# Patient Record
Sex: FEMALE | Race: BLACK OR AFRICAN AMERICAN
[De-identification: names, ages, dates, MRNs, and addresses within clinical notes are randomized per-mention and may not be internally consistent; named-entity substitution may affect disease eponyms.]

---

## 2017-04-25 NOTE — EDM.PDOC
ED HPI GENERAL MEDICAL PROBLEM





- General


Chief Complaint: Back Pain or Injury


Stated Complaint: BACK PAIN


Time Seen by Provider: 04/25/17 10:55





- History of Present Illness


INITIAL COMMENTS - FREE TEXT/NARRATIVE: 





HISTORY AND PHYSICAL:





History of present illness: Patient is a 21-year-old black female history of 

chronic intermittent upper back and neck pain related to scoliosis she uses 

Flexeril when necessary and states that the episode today has been a slightly 

more significant than usual and unresponsive to her Flexeril she denies trauma 

denies numbness weakness or other concerns





Review of systems: 


As per history of present illness and below otherwise all systems reviewed and 

negative.





Past medical history: 


As per history of present illness and as reviewed below otherwise 

noncontributory.





Surgical history: 


As per history of present illness and as reviewed below otherwise 

noncontributory.





Social history: 


No reported history of drug or alcohol abuse.





Family history: 


As per history of present illness and as reviewed below otherwise 

noncontributory.





Physical exam:


HEENT: Atraumatic, normocephalic, pupils reactive, negative for conjunctival 

pallor or scleral icterus, mucous membranes moist, throat clear, neck supple, 

nontender, trachea midline.


Lungs: Clear to auscultation, breath sounds equal bilaterally, chest nontender.


Heart: S1S2, regular, negative for clicks, rubs, or JVD.


Abdomen: Soft, nondistended, nontender. Negative for masses or 

hepatosplenomegaly. Negative for costovertebral tenderness.


Pelvis: Stable nontender.


Genitourinary: Deferred.


Rectal: Deferred.


Extremities: Atraumatic, negative for cords or calf pain. Neurovascular 

unremarkable.


Neuro: Awake, alert, oriented. Cranial nerves II through XII unremarkable. 

Cerebellum unremarkable. Motor and sensory unremarkable throughout. Exam 

nonfocal.


Back: Patient is a mild tenderness in the paravertebral region at the level of 

the cervical and thoracic spine no vertebral body or point tenderness no 

cervical radiculopathy


Diagnostics:


None





Therapeutics:


Toradol 60 mg IM hydrocodone 5 mg by mouth





Impression: 


#1 chronic intermittent cervical thoracic pain #2 history of scoliosis





Definitive disposition and diagnosis as appropriate pending reevaluation and 

review of above.


  ** Back


Pain Score (Numeric/FACES): 10





- Related Data


 Allergies











Allergy/AdvReac Type Severity Reaction Status Date / Time


 


aspirin Allergy  Respiratory Verified 04/25/17 11:08





   Distress  


 


naproxen [From Aleve] Allergy  Respiratory Verified 04/25/17 11:08





   Distress  











Home Meds: 


 Home Meds





Albuterol Sulfate [Proventil Hfa] 2 puff INH Q6HR PRN 04/25/17 [History]


Cyclobenzaprine [Flexeril] 5 mg PO DAILY PRN 04/25/17 [History]


Levalbuterol HCl [Xopenex] 1 dose INH Q6HR PRN 04/25/17 [History]











Past Medical History


Cardiovascular History: Reports: High cholesterol


Respiratory History: Reports: Asthma


Musculoskeletal History: Reports: Other (see below)


Other Musculoskeletal History: scoliosis





Social & Family History





- Family History


Family Medical History: Noncontributory





- Tobacco Use


Smoking Status *Q: Never Smoker





- Caffeine Use


Caffeine Use: Reports: None





- Recreational Drug Use


Recreational Drug Use: No





ED ROS GENERAL





- Review of Systems


Review Of Systems: ROS reveals no pertinent complaints other than HPI.





ED EXAM, GENERAL





- Physical Exam


Exam: See Below (See dictation)





Course





- Vital Signs


Last Recorded V/S: 





 Last Vital Signs











Temp  36.6 C   04/25/17 11:05


 


Pulse  104 H  04/25/17 11:05


 


Resp  18   04/25/17 11:05


 


BP  133/83   04/25/17 11:05


 


Pulse Ox  98   04/25/17 11:05














- Orders/Labs/Meds


Meds: 





Medications














Discontinued Medications














Generic Name Dose Route Start Last Admin





  Trade Name Freq  PRN Reason Stop Dose Admin


 


Hydrocodone Bitart/Acetaminophen  1 tab  04/25/17 11:07  04/25/17 11:16





  Norco 325-5 Mg  PO  04/25/17 11:08  1 tab





  ONETIME ONE   Administration


 


Ketorolac Tromethamine  60 mg  04/25/17 11:06  04/25/17 11:17





  Toradol  IM  04/25/17 11:07  60 mg





  ONETIME ONE   Administration














Departure





- Departure


Time of Disposition: 11:23


Disposition: Home, Self-Care 01


Condition: good


Clinical Impression: 


 Chronic back pain, History of scoliosis





Forms:  ED Department Discharge


Additional Instructions: 


The following information is given to patients seen in the emergency department 

who are being discharged to home. This information is to outline your options 

for follow-up care. We provide all patients seen in our emergency department 

with a follow-up referral.





The need for follow-up, as well as the timing and circumstances, are variable 

depending upon the specifics of your emergency department visit.





If you don't have a primary care physician on staff, we will provide you with a 

referral. We always advise you to contact your personal physician following an 

emergency department visit to inform them of the circumstance of the visit and 

for follow-up with them and/or the need for any referrals to a consulting 

specialist.





The emergency department will also refer you to a specialist when appropriate. 

This referral assures that you have the opportunity for followup care with a 

specialist. All of these measure are taken in an effort to provide you with 

optimal care, which includes your followup.





Under all circumstances we always encourage you to contact your private 

physician who remains a resource for coordinating  your care. When calling for 

followup care, please make the office aware that this follow-up is from your 

recent emergency room visit. If for any reason you are refused follow-up, 

please contact the [facility] emergency department at [phone] 

and asked to speak to the emergency department charge nurse.























Continue current medications Ultram as prescribed follow up primary medical 

doctor one to 2 days return as needed as discussed

## 2017-05-21 NOTE — EDM.PDOC
ED HPI GENERAL MEDICAL PROBLEM





- General


Chief Complaint: Back Pain or Injury


Stated Complaint: BACK SPASMS


Time Seen by Provider: 05/21/17 12:50


Source of Information: Reports: Patient


History Limitations: Reports: No Limitations





- History of Present Illness


INITIAL COMMENTS - FREE TEXT/NARRATIVE: 





History of present illness:


[21-year-old female coming in complaining of back pain. Patient has a known 

history of back pain was seen in ED for this prior and indicated she felt 

followup with physical therapy as directed. If she is out of her muscle 

relaxers and is having spasm in her lower back again.]





Review of systems: 


As per history of present illness and below otherwise all systems reviewed and 

negative.





Past medical history: 


As per history of present illness and as reviewed below otherwise 

noncontributory.





Surgical history: 


As per history of present illness and as reviewed below otherwise 

noncontributory.





Social history: 


No reported history of drug or alcohol abuse.





Family history: 


As per history of present illness and as reviewed below otherwise 

noncontributory.





Physical exam:


HEENT: Atraumatic, normocephalic, pupils reactive, negative for conjunctival 

pallor or scleral icterus, mucous membranes moist, throat clear, neck supple, 

nontender, trachea midline.


Lungs: Clear to auscultation, breath sounds equal bilaterally, chest nontender.


Heart: S1S2, regular, negative for clicks, rubs, or JVD.


Abdomen: Soft, nondistended, nontender. Negative for masses or 

hepatosplenomegaly. Negative for costovertebral tenderness.


Pelvis: Stable nontender.


Genitourinary: Deferred.


Rectal: Deferred.


Extremities: Atraumatic, negative for cords or calf pain. Neurovascular 

unremarkable.


Neuro: Awake, alert, oriented. Cranial nerves II through XII unremarkable. 

Cerebellum unremarkable. Motor and sensory unremarkable throughout. Exam 

nonfocal.








Global assessment was benign save subjective complaint as noted in the history 

of present illness





Diagnostics:


[]





Therapeutics:


[]





Impression: 


[Back pain]





Plan:


[Norflex, followup with PCP]





Definitive disposition and diagnosis as appropriate pending reevaluation and 

review of above.








  ** upper back


Pain Score (Numeric/FACES): 8





- Related Data


 Allergies











Allergy/AdvReac Type Severity Reaction Status Date / Time


 


aspirin Allergy  Respiratory Verified 05/21/17 12:43





   Distress  


 


naproxen [From Aleve] Allergy  Respiratory Verified 05/21/17 12:43





   Distress  











Home Meds: 


 Home Meds





Orphenadrine [Norflex] 100 mg PO BID #28 tab.er 05/21/17 [Rx]











Past Medical History


Cardiovascular History: Reports: High Cholesterol


Respiratory History: Reports: Asthma


Musculoskeletal History: Reports: Other (See Below)


Other Musculoskeletal History: scoliosis


Hematologic History: Reports: Other (See Below)


Other Hematologic History: Sickle cell trait, Factor 7, Factor 11 and factor 12 

deficiency





Social & Family History





- Family History


Family Medical History: Noncontributory





- Tobacco Use


Smoking Status *Q: Never Smoker


Second Hand Smoke Exposure: No





- Caffeine Use


Caffeine Use: Reports: Tea





- Recreational Drug Use


Recreational Drug Use: No





ED ROS GENERAL





- Review of Systems


Review Of Systems: See Below (See history of present illness)





ED EXAM,LOWER BACK PAIN/INJURY





- Physical Exam


Exam: See Below (See history of present illness)





Course





- Vital Signs


Last Recorded V/S: 





 Last Vital Signs











Temp  36.9 C   05/21/17 12:45


 


Pulse  90   05/21/17 12:45


 


Resp  18   05/21/17 12:45


 


BP  115/73   05/21/17 12:45


 


Pulse Ox      














- Orders/Labs/Meds


Meds: 





Medications














Discontinued Medications














Generic Name Dose Route Start Last Admin





  Trade Name Freq  PRN Reason Stop Dose Admin


 


Ketorolac Tromethamine  60 mg  05/21/17 13:07  





  Toradol  IM  05/21/17 13:08  





  ONETIME ONE   














Departure





- Departure


Time of Disposition: 13:25


Disposition: Home, Self-Care 01


Condition: good


Clinical Impression: 


 Chronic back pain








- Discharge Information


Prescriptions: 


Orphenadrine [Norflex] 100 mg PO BID #28 tab.er


Forms:  ED Department Discharge


Additional Instructions: 


The following information is given to patients seen in the emergency department 

who are being discharged to home. This information is to outline your options 

for follow-up care. We provide all patients seen in our emergency department 

with a follow-up referral.





The need for follow-up, as well as the timing and circumstances, are variable 

depending upon the specifics of your emergency department visit.





If you don't have a primary care physician on staff, we will provide you with a 

referral. We always advise you to contact your personal physician following an 

emergency department visit to inform them of the circumstance of the visit and 

for follow-up with them and/or the need for any referrals to a consulting 

specialist.





The emergency department will also refer you to a specialist when appropriate. 

This referral assures that you have the opportunity for follow-up care with a 

specialist. All of these measure are taken in an effort to provide you with 

optimal care, which includes your follow-up.





Under all circumstances we always encourage you to contact your private 

physician who remains a resource for coordinating your care. When calling for 

follow-up care, please make the office aware that this follow-up is from your 

recent emergency room visit. If for any reason you are refused follow-up, 

please contact the [facility] Emergency 

Department at [phone] and asked to speak to the emergency department 

charge nurse.





Take medication as directed








Please followup with your PCP for physical therapy as discussed








Return to ED as needed as discussed

## 2017-08-29 ENCOUNTER — HOSPITAL ENCOUNTER (EMERGENCY)
Dept: HOSPITAL 56 - MW.ED | Age: 22
Discharge: HOME | End: 2017-08-29
Payer: COMMERCIAL

## 2017-08-29 VITALS — DIASTOLIC BLOOD PRESSURE: 65 MMHG | SYSTOLIC BLOOD PRESSURE: 111 MMHG

## 2017-08-29 DIAGNOSIS — N39.0: ICD-10-CM

## 2017-08-29 DIAGNOSIS — Z88.8: ICD-10-CM

## 2017-08-29 DIAGNOSIS — A09: Primary | ICD-10-CM

## 2017-08-29 DIAGNOSIS — Z88.6: ICD-10-CM

## 2017-08-29 DIAGNOSIS — J45.909: ICD-10-CM

## 2017-08-29 DIAGNOSIS — E78.00: ICD-10-CM

## 2017-08-29 LAB
CHLORIDE SERPL-SCNC: 105 MMOL/L (ref 98–110)
SODIUM SERPL-SCNC: 141 MMOL/L (ref 136–146)

## 2017-08-29 PROCEDURE — 96374 THER/PROPH/DIAG INJ IV PUSH: CPT

## 2017-08-29 PROCEDURE — 81025 URINE PREGNANCY TEST: CPT

## 2017-08-29 PROCEDURE — 80053 COMPREHEN METABOLIC PANEL: CPT

## 2017-08-29 PROCEDURE — 83690 ASSAY OF LIPASE: CPT

## 2017-08-29 PROCEDURE — 99283 EMERGENCY DEPT VISIT LOW MDM: CPT

## 2017-08-29 PROCEDURE — 81001 URINALYSIS AUTO W/SCOPE: CPT

## 2017-08-29 PROCEDURE — 85025 COMPLETE CBC W/AUTO DIFF WBC: CPT

## 2017-08-29 PROCEDURE — 96361 HYDRATE IV INFUSION ADD-ON: CPT

## 2017-08-29 PROCEDURE — 36415 COLL VENOUS BLD VENIPUNCTURE: CPT

## 2017-08-29 NOTE — EDM.PDOC
ED HPI GENERAL MEDICAL PROBLEM





- General


Chief Complaint: Gastrointestinal Problem


Stated Complaint: DEHYDRATED/NAUSEA/CHILLS/WEAKNESS


Time Seen by Provider: 08/29/17 18:23


Source of Information: Reports: Patient


History Limitations: Reports: No Limitations





- History of Present Illness


INITIAL COMMENTS - FREE TEXT/NARRATIVE: 





HISTORY AND PHYSICAL:





History of present illness:


Patient is a 21-year-old female that presents to the emergency room today with 

complaints of nausea, vomiting, diarrhea, decreased appetite, weakness, and 

"dehydration" since Saturday. Patient reports that she has not felt well and 

has tried to increase her fluids and her own but is not improving. Denies any 

fever, abdominal pain, bloody stools, or dysuria. Reports she has not been 

around anyone who has been ill recently.


Reports she does not have any significant health problems.


Reports she started menses on Saturday.





Review of systems: 


As per history of present illness and below otherwise all systems reviewed and 

negative.





Past medical history: 


As per history of present illness and as reviewed below otherwise 

noncontributory.





Surgical history: 


As per history of present illness and as reviewed below otherwise 

noncontributory.





Social history: 


No reported history of drug or alcohol abuse.





Family history: 


As per history of present illness and as reviewed below otherwise 

noncontributory.





Physical exam:


Gen.: Nontoxic appearing 21-year-old female. Able to answer questions 

appropriately. Able to speak in full sentences without shortness of breath. 

Alert and oriented 3


HEENT: Atraumatic, normocephalic, pupils reactive, negative for conjunctival 

pallor or scleral icterus, mucous membranes moist, throat clear, neck supple, 

nontender, trachea midline.


Lungs: Clear to auscultation, breath sounds equal bilaterally, chest nontender.


Heart: S1S2, regular, negative for clicks, rubs, or JVD.


Abdomen: Soft, nondistended, nontender. Negative for masses or 

hepatosplenomegaly. Negative for costovertebral tenderness.


Pelvis: Stable nontender.


Genitourinary: Deferred.


Rectal: Deferred.


Extremities: Atraumatic, negative for cords or calf pain. Neurovascular 

unremarkable.


Neuro: Awake, alert, oriented. Cranial nerves II through XII unremarkable. 

Cerebellum unremarkable. Motor and sensory unremarkable throughout. Exam 

nonfocal.





Diagnostics:


CBC, CMP, lipase, UA, hCGU





Therapeutics:


IV fluids and Zofran





Impression: 


1. Viral gastroenteritis


2. UTI





1. Please continue to encourage fluids. Intake Tylenol and/or ibuprofen over-the

-counter for pain and discomfort.


2. Take antibiotic as prescribed


3. Follow-up with her primary care provider in the next 1-2 days. Return to the 

ED as needed as discussed.





Definitive disposition and diagnosis as appropriate pending reevaluation and 

review of above.


Onset: Other (Saturday)


Onset Date: 08/26/17


Location: Reports: Generalized


  ** no pain


Pain Score (Numeric/FACES): 0





- Related Data


 Allergies











Allergy/AdvReac Type Severity Reaction Status Date / Time


 


aspirin Allergy  Respiratory Verified 05/21/17 12:43





   Distress  


 


naproxen [From Aleve] Allergy  Respiratory Verified 05/21/17 12:43





   Distress  











Home Meds: 


 Home Meds





Orphenadrine [Norflex] 100 mg PO BID #28 tab.er 05/21/17 [Rx]











Past Medical History


Cardiovascular History: Reports: High Cholesterol


Respiratory History: Reports: Asthma


Musculoskeletal History: Reports: Other (See Below)


Other Musculoskeletal History: scoliosis


Hematologic History: Reports: Other (See Below)


Other Hematologic History: Sickle cell trait, Factor 7, Factor 11 and factor 12 

deficiency





Social & Family History





- Family History


Family Medical History: Noncontributory





- Tobacco Use


Smoking Status *Q: Never Smoker


Second Hand Smoke Exposure: No





- Caffeine Use


Caffeine Use: Reports: Tea





- Recreational Drug Use


Recreational Drug Use: No





ED ROS GENERAL





- Review of Systems


Review Of Systems: ROS reveals no pertinent complaints other than HPI.





ED EXAM, GI/ABD





- Physical Exam


Exam: See Below (See dictation)





Course





- Vital Signs


Last Recorded V/S: 


 Last Vital Signs











Temp  36.9 C   08/29/17 19:52


 


Pulse  69   08/29/17 19:52


 


Resp  18   08/29/17 19:52


 


BP  111/65   08/29/17 19:52


 


Pulse Ox  98   08/29/17 19:52














- Orders/Labs/Meds


Labs: 


 Laboratory Tests











  08/29/17 08/29/17 08/29/17 Range/Units





  18:24 18:24 18:24 


 


WBC  10.60    (4.0-11.0)  K/uL


 


RBC  4.68    (4.30-5.90)  M/uL


 


Hgb  11.8 L    (12.0-16.0)  g/dL


 


Hct  35.4 L    (36.0-46.0)  %


 


MCV  75.6 L    (80.0-98.0)  fL


 


MCH  25.2 L    (27.0-32.0)  pg


 


MCHC  33.3    (31.0-37.0)  g/dL


 


RDW Std Deviation  37.0    (28.0-62.0)  fl


 


RDW Coeff of Virgil  14    (11.0-15.0)  %


 


Plt Count  344    (150-400)  K/uL


 


MPV  9.50    (7.40-12.00)  fL


 


Neut % (Auto)  59.5    (48.0-80.0)  %


 


Lymph % (Auto)  30.8    (16.0-40.0)  %


 


Mono % (Auto)  7.8    (0.0-15.0)  %


 


Eos % (Auto)  1.4    (0.0-7.0)  %


 


Baso % (Auto)  0.5    (0.0-1.5)  %


 


Neut # (Auto)  6.3 H    (1.4-5.7)  K/uL


 


Lymph # (Auto)  3.3 H    (0.6-2.4)  K/uL


 


Mono # (Auto)  0.8    (0.0-0.8)  K/uL


 


Eos # (Auto)  0.2    (0.0-0.7)  K/uL


 


Baso # (Auto)  0.1    (0.0-0.1)  K/uL


 


Nucleated RBC %  0.0    /100WBC


 


Nucleated RBCs #  0    K/uL


 


Sodium   141   (136-146)  mmol/L


 


Potassium   3.5   (3.5-5.1)  mmol/L


 


Chloride   105   ()  mmol/L


 


Carbon Dioxide   24   (21-31)  mmol/L


 


BUN   10   (6.0-23.0)  mg/dL


 


Creatinine   0.8   (0.6-1.5)  mg/dL


 


Est Cr Clr Drug Dosing   108.17   mL/min


 


Estimated GFR (MDRD)   > 60.0   ml/min


 


Glucose   91   ()  mg/dL


 


Calcium   9.7   (8.8-10.8)  mg/dL


 


Total Bilirubin   0.6   (0.1-1.5)  mg/dL


 


AST   15   (5-40)  IU/L


 


ALT   11   (8-54)  IU/L


 


Alkaline Phosphatase   52   ()  


 


Total Protein   8.3 H   (6.0-8.0)  g/dL


 


Albumin   4.6   (3.5-5.0)  g/dL


 


Globulin   3.7 H   (2.0-3.5)  g/dL


 


Albumin/Globulin Ratio   1.2 L   (1.3-2.8)  


 


Lipase    39  (7-80)  U/L


 


Urine Color     


 


Urine Appearance     


 


Urine pH     (5.0-8.0)  


 


Ur Specific Gravity     (1.001-1.035)  


 


Urine Protein     (NEGATIVE)  mg/dL


 


Urine Glucose (UA)     (NEGATIVE)  mg/dL


 


Urine Ketones     (NEGATIVE)  mg/dL


 


Urine Occult Blood     (NEGATIVE)  


 


Urine Nitrite     (NEGATIVE)  


 


Urine Bilirubin     (NEGATIVE)  


 


Urine Urobilinogen     (<2.0)  EU/dL


 


Ur Leukocyte Esterase     (NEGATIVE)  


 


Urine RBC     (0-2/HPF)  


 


Urine WBC     (0-5/HPF)  


 


Ur Epithelial Cells     (NONE-FEW)  


 


Urine Bacteria     (NEGATIVE)  


 


Urine HCG, Qual     (NEGATIVE)  














  08/29/17 08/29/17 Range/Units





  19:15 19:15 


 


WBC    (4.0-11.0)  K/uL


 


RBC    (4.30-5.90)  M/uL


 


Hgb    (12.0-16.0)  g/dL


 


Hct    (36.0-46.0)  %


 


MCV    (80.0-98.0)  fL


 


MCH    (27.0-32.0)  pg


 


MCHC    (31.0-37.0)  g/dL


 


RDW Std Deviation    (28.0-62.0)  fl


 


RDW Coeff of Virgil    (11.0-15.0)  %


 


Plt Count    (150-400)  K/uL


 


MPV    (7.40-12.00)  fL


 


Neut % (Auto)    (48.0-80.0)  %


 


Lymph % (Auto)    (16.0-40.0)  %


 


Mono % (Auto)    (0.0-15.0)  %


 


Eos % (Auto)    (0.0-7.0)  %


 


Baso % (Auto)    (0.0-1.5)  %


 


Neut # (Auto)    (1.4-5.7)  K/uL


 


Lymph # (Auto)    (0.6-2.4)  K/uL


 


Mono # (Auto)    (0.0-0.8)  K/uL


 


Eos # (Auto)    (0.0-0.7)  K/uL


 


Baso # (Auto)    (0.0-0.1)  K/uL


 


Nucleated RBC %    /100WBC


 


Nucleated RBCs #    K/uL


 


Sodium    (136-146)  mmol/L


 


Potassium    (3.5-5.1)  mmol/L


 


Chloride    ()  mmol/L


 


Carbon Dioxide    (21-31)  mmol/L


 


BUN    (6.0-23.0)  mg/dL


 


Creatinine    (0.6-1.5)  mg/dL


 


Est Cr Clr Drug Dosing    mL/min


 


Estimated GFR (MDRD)    ml/min


 


Glucose    ()  mg/dL


 


Calcium    (8.8-10.8)  mg/dL


 


Total Bilirubin    (0.1-1.5)  mg/dL


 


AST    (5-40)  IU/L


 


ALT    (8-54)  IU/L


 


Alkaline Phosphatase    ()  


 


Total Protein    (6.0-8.0)  g/dL


 


Albumin    (3.5-5.0)  g/dL


 


Globulin    (2.0-3.5)  g/dL


 


Albumin/Globulin Ratio    (1.3-2.8)  


 


Lipase    (7-80)  U/L


 


Urine Color   YELLOW  


 


Urine Appearance   CLEAR  


 


Urine pH   6.0  (5.0-8.0)  


 


Ur Specific Gravity   1.025  (1.001-1.035)  


 


Urine Protein   NEGATIVE  (NEGATIVE)  mg/dL


 


Urine Glucose (UA)   NEGATIVE  (NEGATIVE)  mg/dL


 


Urine Ketones   TRACE H  (NEGATIVE)  mg/dL


 


Urine Occult Blood   LARGE H  (NEGATIVE)  


 


Urine Nitrite   NEGATIVE  (NEGATIVE)  


 


Urine Bilirubin   NEGATIVE  (NEGATIVE)  


 


Urine Urobilinogen   0.2  (<2.0)  EU/dL


 


Ur Leukocyte Esterase   TRACE  (NEGATIVE)  


 


Urine RBC   3-6  (0-2/HPF)  


 


Urine WBC   8-10  (0-5/HPF)  


 


Ur Epithelial Cells   FEW  (NONE-FEW)  


 


Urine Bacteria   FEW  (NEGATIVE)  


 


Urine HCG, Qual  NEGATIVE   (NEGATIVE)  











Meds: 


Medications














Discontinued Medications














Generic Name Dose Route Start Last Admin





  Trade Name Freq  PRN Reason Stop Dose Admin


 


Sodium Chloride  1,000 mls @ 999 mls/hr  08/29/17 18:16  08/29/17 18:27





  Normal Saline  IV  08/29/17 19:16  999 mls/hr





  STAT ONE   Administration


 


Ondansetron HCl  4 mg  08/29/17 18:16  08/29/17 18:29





  Zofran  IVPUSH  08/29/17 18:17  4 mg





  ONETIME ONE   Administration














Departure





- Departure


Time of Disposition: 19:54


Disposition: Home, Self-Care 01


Condition: Good


Clinical Impression: 


UTI (urinary tract infection)


Qualifiers:


 Urinary tract infection type: site unspecified 








- Discharge Information


Referrals: 


Jacinto Bennett MD [Primary Care Provider] - 


Forms:  ED Department Discharge


Additional Instructions: 





The following information is given to patients seen in the emergency department 

who are being discharged to home. This information is to outline your options 

for follow-up care. We provide all patients seen in our emergency department 

with a follow-up referral.





The need for follow-up, as well as the timing and circumstances, are variable 

depending upon the specifics of your emergency department visit.





If you don't have a primary care physician on staff, we will provide you with a 

referral. We always advise you to contact your personal physician following an 

emergency department visit to inform them of the circumstance of the visit and 

for follow-up with them and/or the need for any referrals to a consulting 

specialist.





The emergency department will also refer you to a specialist when appropriate. 

This referral assures that you have the opportunity for followup care with a 

specialist. All of these measure are taken in an effort to provide you with 

optimal care, which includes your followup.





Under all circumstances we always encourage you to contact your private 

physician who remains a resource for coordinating  your care. When calling for 

followup care, please make the office aware that this follow-up is from your 

recent emergency room visit. If for any reason you are refused follow-up, 

please contact the [facility] emergency department at [phone] 

and asked to speak to the emergency department charge nurse.





[facility]


Primary Care


[address] [place] [id number]


Phone: [phone]


Fax: [phone]





1. Please continue to encourage fluids. Intake Tylenol and/or ibuprofen over-the

-counter for pain and discomfort.


2. Take antibiotic as prescribed


3. Follow-up with her primary care provider in the next 1-2 days. Return to the 

ED as needed as discussed.

## 2017-12-21 ENCOUNTER — HOSPITAL ENCOUNTER (EMERGENCY)
Dept: HOSPITAL 56 - MW.ED | Age: 22
Discharge: HOME | End: 2017-12-21
Payer: COMMERCIAL

## 2017-12-21 VITALS — SYSTOLIC BLOOD PRESSURE: 116 MMHG | DIASTOLIC BLOOD PRESSURE: 65 MMHG

## 2017-12-21 DIAGNOSIS — J35.8: Primary | ICD-10-CM

## 2017-12-21 DIAGNOSIS — Z88.6: ICD-10-CM

## 2017-12-21 DIAGNOSIS — Z88.8: ICD-10-CM

## 2017-12-21 DIAGNOSIS — E78.00: ICD-10-CM

## 2017-12-21 NOTE — EDM.PDOC
ED HPI GENERAL MEDICAL PROBLEM





- General


Chief Complaint: ENT Problem


Stated Complaint: THROAT HURTS


Time Seen by Provider: 12/21/17 09:17


Source of Information: Reports: Patient





- History of Present Illness


INITIAL COMMENTS - FREE TEXT/NARRATIVE: 





HISTORY AND PHYSICAL:


History of present illness:


[Patient presents with a foreign body sensation on her right tonsil no sore 

throat or pain associated is more of an annoyance no fever nausea vomiting 

chills sweats no shortness breath or difficulty breathing





He states that she noticed this foreign body sensation beginning last night





]


Review of systems: 


As per history of present illness and below otherwise all systems reviewed and 

negative.


Past medical history: 


As per history of present illness and as reviewed below otherwise 

noncontributory.


Surgical history: 


As per history of present illness and as reviewed below otherwise 

noncontributory.


Social history: 


No reported history of drug or alcohol abuse.


Family history: 


As per history of present illness and as reviewed below otherwise 

noncontributory.


Physical exam:


HEENT: Atraumatic, normocephalic, pupils reactive, negative for conjunctival 

pallor or scleral icterus, mucous membranes moist, throat clear, neck supple, 

nontender, trachea midline. Oropharynx is clear tonsils 2+ no erythema no 

exudates she does have some accessory tissue that appears hard potentially a 

stone I am uncertain of the etiology however it does not appear infectious at 

this time.


Lungs: Clear to auscultation, breath sounds equal bilaterally, chest nontender.


Heart: S1S2, regular, negative for clicks, rubs, or JVD.


Abdomen: Soft, nondistended, nontender. Negative for masses or 

hepatosplenomegaly. Negative for costovertebral tenderness.


Pelvis: Stable nontender.


Genitourinary: Deferred.


Rectal: Deferred.


Extremities: Atraumatic, negative for cords or calf pain. Neurovascular 

unremarkable.


Neuro: Awake, alert, oriented. Cranial nerves II through XII unremarkable. 

Cerebellum unremarkable. Motor and sensory unremarkable throughout. Exam 

nonfocal.


Diagnostics:


[Clinical]


Therapeutics:


[]Referred to ENT





Impression: 


[]Accessory tissue right tonsil potentially a stone


Definitive disposition and diagnosis as appropriate pending reevaluation and 

review of above.





- Related Data


 Allergies











Allergy/AdvReac Type Severity Reaction Status Date / Time


 


aspirin Allergy  Respiratory Verified 05/21/17 12:43





   Distress  


 


naproxen [From Aleve] Allergy  Respiratory Verified 05/21/17 12:43





   Distress  











Home Meds: 


 Home Meds





Amitriptyline [Elavil] PRN 12/21/17 [History]


Sertraline [Zoloft] 100 mg 12/21/17 [History]











Past Medical History


Cardiovascular History: Reports: High Cholesterol


Respiratory History: Reports: Asthma


Musculoskeletal History: Reports: Other (See Below)


Other Musculoskeletal History: scoliosis


Hematologic History: Reports: Other (See Below)


Other Hematologic History: Sickle cell trait, Factor 7, Factor 11 and factor 12 

deficiency





Social & Family History





- Family History


Family Medical History: Noncontributory





- Tobacco Use


Smoking Status *Q: Never Smoker


Second Hand Smoke Exposure: No





- Caffeine Use


Caffeine Use: Reports: Tea





- Recreational Drug Use


Recreational Drug Use: No


Recreational Drug Type: Reports: Marijuana/Hashish


Recreational Drug Use Frequency: Daily





ED ROS GENERAL





- Review of Systems


Review Of Systems: ROS reveals no pertinent complaints other than HPI.





ED EXAM, GENERAL





- Physical Exam


Exam: See Below





Course





- Vital Signs


Last Recorded V/S: 





 Last Vital Signs











Temp  97.3 F   12/21/17 08:54


 


Pulse  99   12/21/17 08:54


 


Resp  16   12/21/17 08:54


 


BP  116/65   12/21/17 08:54


 


Pulse Ox  98   12/21/17 08:54














Departure





- Departure


Time of Disposition: 09:19


Disposition: Home, Self-Care 01


Condition: Good


Clinical Impression: 


 Tonsil stone








- Discharge Information


Referrals: 


PCP,None [Primary Care Provider] - 


Additional Instructions: 


Follow-up with our ENT


No specific treatment warranted at this time


Return if symptoms persist or worsen or fever nausea vomiting chills sweats


ENT may be contacted at number below to schedule appointment





[facility]


Specialty Care - ENT


[facility] [place] [id number]


Phone: [phone]


Fax: [phone]











The following information is given to patients seen in the emergency department 

who are being discharged to home. This information is to outline your options 

for follow-up care. We provide all patients seen in our emergency department 

with a follow-up referral.





The need for follow-up, as well as the timing and circumstances, are variable 

depending upon the specifics of your emergency department visit.





If you don't have a primary care physician on staff, we will provide you with a 

referral. We always advise you to contact your personal physician following an 

emergency department visit to inform them of the circumstance of the visit and 

for follow-up with them and/or the need for any referrals to a consulting 

specialist.





The emergency department will also refer you to a specialist when appropriate. 

This referral assures that you have the opportunity for follow-up care with a 

specialist. All of these measure are taken in an effort to provide you with 

optimal care, which includes your follow-up.





Under all circumstances we always encourage you to contact your private 

physician who remains a resource for coordinating your care. When calling for 

follow-up care, please make the office aware that this follow-up is from your 

recent emergency room visit. If for any reason you are refused follow-up, 

please contact the [facility] emergency department at [phone] 

and asked to speak to the emergency department charge nurse.

## 2018-02-14 ENCOUNTER — HOSPITAL ENCOUNTER (EMERGENCY)
Dept: HOSPITAL 56 - MW.ED | Age: 23
Discharge: HOME | End: 2018-02-14
Payer: COMMERCIAL

## 2018-02-14 VITALS — DIASTOLIC BLOOD PRESSURE: 88 MMHG | SYSTOLIC BLOOD PRESSURE: 130 MMHG

## 2018-02-14 DIAGNOSIS — E78.00: ICD-10-CM
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
## 2018-02-14 DIAGNOSIS — S40.022A: ICD-10-CM

## 2018-02-14 DIAGNOSIS — S10.93XA: Primary | ICD-10-CM

## 2018-02-14 DIAGNOSIS — S00.81XA: ICD-10-CM

## 2018-02-14 DIAGNOSIS — Z88.6: ICD-10-CM
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
## 2018-02-14 NOTE — CT
EXAMINATION:  Non contrast CT head. Coronal and sagittal reformats.

 

HISTORY: Pain

 

FINDINGS:  

No evidence of intra or extra axial hemorrhage, mass,  midline shift, hydrocephalus or edema.

 

No hypoattenuation changes in the major vascular territories to suggest acute infarct.  

 

No abnormal intracranial calcifications are detected.  No evidence of substantial vascular calcificat
ions.

 

Trace fluid within the right maxillary sinus. Remaining paranasal sinuses and mastoid air cells are c
lear. Orbits and globes are symmetric.

 

Pituitary fossa appears unremarkable.

 

Calvarium is intact. No evidence of skull fracture. 

 

IMPRESSION: 

No acute intracranial findings.
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
## 2018-05-22 ENCOUNTER — HOSPITAL ENCOUNTER (EMERGENCY)
Dept: HOSPITAL 56 - MW.ED | Age: 23
Discharge: HOME | End: 2018-05-22
Payer: COMMERCIAL

## 2018-05-22 VITALS — SYSTOLIC BLOOD PRESSURE: 151 MMHG | DIASTOLIC BLOOD PRESSURE: 59 MMHG

## 2018-05-22 DIAGNOSIS — E78.00: ICD-10-CM

## 2018-05-22 DIAGNOSIS — Z88.6: ICD-10-CM

## 2018-05-22 DIAGNOSIS — J01.01: Primary | ICD-10-CM

## 2018-05-22 NOTE — EDM.PDOC
ED HPI GENERAL MEDICAL PROBLEM





- General


Chief Complaint: ENT Problem


Stated Complaint: SINUS INFECTION


Time Seen by Provider: 05/22/18 13:06


Source of Information: Reports: Patient


History Limitations: Reports: No Limitations





- History of Present Illness


INITIAL COMMENTS - FREE TEXT/NARRATIVE: 





Presents reporting a sinus infection. The patient states that she has been 

under the care of Dr. Jackie M.D., ENT over in [facility]. She has been 

diagnosed with chronic sinusitis and was scheduled for what sounds like a FESS 

and T & A on April 30 but her paperwork was not in order. Now she has pain and 

pressure in the maxillary sinus area mostly on the right as well as a runny nose

, cough and fever up to 101.  She has some red brown sinus drainage. 





- Related Data


 Allergies











Allergy/AdvReac Type Severity Reaction Status Date / Time


 


aspirin Allergy  Respiratory Verified 02/14/18 10:24





   Distress  


 


naproxen [From Aleve] Allergy  Respiratory Verified 02/14/18 10:24





   Distress  











Home Meds: 


 Home Meds





Amitriptyline [Elavil] PRN 12/21/17 [History]


Sertraline [Zoloft] 100 mg 12/21/17 [History]


Doxycycline [Vibramycin] 1 cap PO BID #20 cap 05/22/18 [Rx]











Past Medical History


HEENT History: Reports: None


Cardiovascular History: Reports: High Cholesterol


Respiratory History: Reports: Asthma


Other OB/BYN History: irreg. periods


Musculoskeletal History: Reports: Other (See Below)


Other Musculoskeletal History: scoliosis


Neurological History: Reports: None


Psychiatric History: Reports: Anxiety, Depression


Hematologic History: Reports: Other (See Below)


Other Hematologic History: Sickle cell trait, Factor 7, Factor 11 and factor 12 

deficiency


Immunologic History: Reports: None


Dermatologic History: Reports: None





- Infectious Disease History


Infectious Disease History: Reports: None





- Past Surgical History


Head Surgeries/Procedures: Reports: None





Social & Family History





- Family History


Family Medical History: Noncontributory





- Caffeine Use


Caffeine Use: Reports: None





ED ROS ENT





- Review of Systems


Review Of Systems: ROS reveals no pertinent complaints other than HPI.





ED EXAM, ENT





- Physical Exam


Exam: See Below


Exam Limited By: No Limitations


General Appearance: Alert, No Apparent Distress


Ears: Normal External Exam, Normal TMs


Nose: Normal Inspection, No Blood


Mouth/Throat: Normal Inspection, Normal Oropharynx


Head: Atraumatic, Normocephalic, Sinus Tenderness (I lateral maxillary, right 

greater than left to percussion)


Neck: Normal Inspection, Lymphadenopathy (L), Lymphadenopathy (R) (Shotty)


Respiratory/Chest: No Respiratory Distress, Lungs Clear, Normal Breath Sounds


Cardiovascular: Normal Peripheral Pulses, Regular Rate, Rhythm, No Murmur


GI/Abdominal: Soft


Extremities: Normal Inspection


Neurological: Alert, Oriented


Psychiatric: Normal Affect, Normal Mood


Skin: Warm, Dry, Intact, Normal Color, No Rash


Lymphatic: No Adenopathy





Departure





- Departure


Time of Disposition: 13:26


Disposition: Home, Self-Care 01


Condition: Good


Clinical Impression: 


Sinusitis


Qualifiers:


 Sinusitis location: maxillary Chronicity: acute Recurrence: recurrent 

Qualified Code(s): J01.01 - Acute recurrent maxillary sinusitis








- Discharge Information


Referrals: 


PCP,None [Primary Care Provider] - 


Additional Instructions: 


1.  Drink plenty of fluids and rest.  Tylenol or Advil as needed for pain and 

fever


2.  Doxycycline twice daily for 10 days.


3.  Follow-up with Dr. Youssef

## 2018-06-08 ENCOUNTER — HOSPITAL ENCOUNTER (EMERGENCY)
Dept: HOSPITAL 56 - MW.ED | Age: 23
Discharge: HOME | End: 2018-06-08
Payer: SELF-PAY

## 2018-06-08 VITALS — SYSTOLIC BLOOD PRESSURE: 121 MMHG | DIASTOLIC BLOOD PRESSURE: 73 MMHG

## 2018-06-08 DIAGNOSIS — Z88.5: ICD-10-CM

## 2018-06-08 DIAGNOSIS — Z88.6: ICD-10-CM

## 2018-06-08 DIAGNOSIS — M54.5: Primary | ICD-10-CM

## 2018-06-08 DIAGNOSIS — Z79.899: ICD-10-CM

## 2018-06-08 DIAGNOSIS — M62.838: ICD-10-CM

## 2018-06-08 DIAGNOSIS — E78.00: ICD-10-CM

## 2018-06-08 DIAGNOSIS — J45.909: ICD-10-CM

## 2018-06-08 DIAGNOSIS — G89.29: ICD-10-CM

## 2018-06-08 DIAGNOSIS — M41.9: ICD-10-CM

## 2018-06-08 PROCEDURE — 99283 EMERGENCY DEPT VISIT LOW MDM: CPT

## 2018-06-08 PROCEDURE — 96372 THER/PROPH/DIAG INJ SC/IM: CPT

## 2018-06-08 NOTE — EDM.PDOC
ED HPI GENERAL MEDICAL PROBLEM





- General


Chief Complaint: Back Pain or Injury


Stated Complaint: BACK PAIN


Time Seen by Provider: 06/08/18 12:30


Source of Information: Reports: Patient


History Limitations: Reports: No Limitations





- History of Present Illness


INITIAL COMMENTS - FREE TEXT/NARRATIVE: 





HISTORY AND PHYSICAL:





History of present illness:


[Patient comes to the emergency room complaining of mid back pain. She has a 

history of scoliosis. This occasionally flares up in the form of muscle spasms 

to either side of her thoracic spine. Her symptoms began several days ago and 

have not improved with Tylenol and ibuprofen. She was previously prescribed 

Flexeril for her symptoms but she's been unable to take this since starting 

fluoxetine. This medication has been working well for her, and she doesn't want 

to switch anti-depressants. In the past when she's been unable to tolerate her 

back pain she is presented to the emergency room for injections. Toradol and 

Norflex have been effective in the past and she is requesting this today. She 

is not requesting any take-home prescriptions for narcotics.


No fever or chills. No low back pain or neck pain. No difficulty urinating or 

burning with urination. She has no other complaints or concerns.]





Review of systems: 


As per history of present illness and below otherwise all systems reviewed and 

negative.





Past medical history: 


As per history of present illness and as reviewed below otherwise 

noncontributory.





Surgical history: 


As per history of present illness and as reviewed below otherwise 

noncontributory.





Social history: 


No reported history of drug or alcohol abuse.





Family history: 


As per history of present illness and as reviewed below otherwise 

noncontributory.





Physical exam:


Gen.: Well-developed well-nourished female in no acute distress. Appears thin 

and healthy. Makes good eye contact and converses with examiner appropriately.


HEENT: Atraumatic, normocephalic. Oral mucous membranes are pink and moist.


Lungs: Clear to auscultation, breath sounds equal bilaterallyr.


Heart: S1S2, regular, negative for clicks, rubs, or JVD.


Back: S type curvature to patients upper thoracic spine. Muscle tightness is 

appreciated to upper right thoracic muscles, mild tenderness with palpation. 

Otherwise no abnormalities or vertebral step-offs. Otherwise nontender.


Extremities: Atraumatic, full range of motion of all extremities. Ambulates 

without difficulty. Neurovascular unremarkable.


Neuro: Awake, alert, oriented.  Motor and sensory unremarkable throughout. Exam 

nonfocal.





Therapeutics:


[Toradol 60mg IM


Norflex 60mg IM]





Impression: 


[mid back pain/spasm


scoliosis]





Plan:


[Toradol 60 mg and Norflex 60 mg given IM in the ER. Patient is discharged to 

home with instructions to follow-up with primary care. She verbalizes 

understanding and is in agreement with today's plan. All questions are answered 

and concerns are addressed.]





Definitive disposition and diagnosis as appropriate pending reevaluation and 

review of above.





  ** Bilateral Lower Back


Pain Score (Numeric/FACES): 8





- Related Data


 Allergies











Allergy/AdvReac Type Severity Reaction Status Date / Time


 


aspirin Allergy  Respiratory Verified 06/08/18 12:04





   Distress  


 


naproxen [From Aleve] Allergy  Respiratory Verified 06/08/18 12:04





   Distress  











Home Meds: 


 Home Meds





Amitriptyline [Elavil] 1 tab PO DAILY PRN 12/21/17 [History]


Sertraline [Zoloft] 100 mg PO DAILY 12/21/17 [History]











Past Medical History





- Past Health History


Medical/Surgical History: Denies Medical/Surgical History


HEENT History: Reports: None


Cardiovascular History: Reports: High Cholesterol


Respiratory History: Reports: Asthma


Other OB/BYN History: irreg. periods


Musculoskeletal History: Reports: Other (See Below)


Other Musculoskeletal History: scoliosis


Neurological History: Reports: None


Psychiatric History: Reports: Anxiety, Depression


Hematologic History: Reports: Other (See Below)


Other Hematologic History: Sickle cell trait, Factor 7, Factor 11 and factor 12 

deficiency


Immunologic History: Reports: None


Dermatologic History: Reports: None





- Infectious Disease History


Infectious Disease History: Reports: None





- Past Surgical History


Head Surgeries/Procedures: Reports: None





Social & Family History





- Family History


Family Medical History: Noncontributory





- Tobacco Use


Smoking Status *Q: Never Smoker


Second Hand Smoke Exposure: No





- Caffeine Use


Caffeine Use: Reports: Coffee, Soda





- Recreational Drug Use


Recreational Drug Use: Yes


Drug Use in Last 12 Months: Yes


Recreational Drug Type: Reports: Marijuana/Hashish





ED ROS GENERAL





- Review of Systems


Review Of Systems: ROS reveals no pertinent complaints other than HPI.





ED EXAM, UPPER BACK/NECK PAIN





- Physical Exam


Exam: See Below





Course





- Vital Signs


Last Recorded V/S: 


 Last Vital Signs











Temp  97.1 F   06/08/18 13:21


 


Pulse  78   06/08/18 13:21


 


Resp  16   06/08/18 13:21


 


BP  121/73   06/08/18 13:21


 


Pulse Ox  96   06/08/18 13:21














- Orders/Labs/Meds


Meds: 


Medications














Discontinued Medications














Generic Name Dose Route Start Last Admin





  Trade Name Kal  PRN Reason Stop Dose Admin


 


Ketorolac Tromethamine  60 mg  06/08/18 12:49  06/08/18 13:09





  Toradol  IM  06/08/18 12:50  60 mg





  ONETIME ONE   Administration





     





     





     





     


 


Orphenadrine Citrate  60 mg  06/08/18 13:00  06/08/18 19:17





  Norflex  IM   Not Given





  Q12H CARLOTTA   





     





     





     





     














Departure





- Departure


Time of Disposition: 12:51


Disposition: Home, Self-Care 01


Condition: Good


Clinical Impression: 


 Chronic back pain








- Discharge Information


Instructions:  Back Pain, Adult


Referrals: 


PCP,None [Primary Care Provider] - 


Forms:  ED Department Discharge


Additional Instructions: 


The following information is given to patients seen in the emergency department 

who are being discharged to home. This information is to outline your options 

for follow-up care. We provide all patients seen in our emergency department 

with a follow-up referral.





The need for follow-up, as well as the timing and circumstances, are variable 

depending upon the specifics of your emergency department visit.





If you don't have a primary care physician on staff, we will provide you with a 

referral. We always advise you to contact your personal physician following an 

emergency department visit to inform them of the circumstance of the visit and 

for follow-up with them and/or the need for any referrals to a consulting 

specialist.





The emergency department will also refer you to a specialist when appropriate. 

This referral assures that you have the opportunity for follow-up care with a 

specialist. All of these measure are taken in an effort to provide you with 

optimal care, which includes your follow-up.





Under all circumstances we always encourage you to contact your private 

physician who remains a resource for coordinating your care. When calling for 

follow-up care, please make the office aware that this follow-up is from your 

recent emergency room visit. If for any reason you are refused follow-up, 

please contact the [facility]  emergency 

department at [phone] and asked to speak to the emergency department 

charge nurse.








[facility]


Primary Care


[facility] [address] [place] [id number]


Phone: [phone]


Fax: [phone]








Follow-up with your local primary care provider at the clinic listed above in 48

-72 hours.


Take medications as prescribed.


Return to ER as needed as discussed.

## 2018-09-26 ENCOUNTER — HOSPITAL ENCOUNTER (EMERGENCY)
Dept: HOSPITAL 56 - MW.ED | Age: 23
Discharge: HOME | End: 2018-09-26
Payer: COMMERCIAL

## 2018-09-26 VITALS — SYSTOLIC BLOOD PRESSURE: 123 MMHG | DIASTOLIC BLOOD PRESSURE: 75 MMHG
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
## 2018-09-26 DIAGNOSIS — S09.90XA: Primary | ICD-10-CM

## 2018-09-26 DIAGNOSIS — S60.222A: ICD-10-CM

## 2018-09-26 DIAGNOSIS — S80.12XA: ICD-10-CM

## 2018-09-26 DIAGNOSIS — Z91.018: ICD-10-CM

## 2018-09-26 DIAGNOSIS — Z91.040: ICD-10-CM

## 2018-09-26 DIAGNOSIS — Z88.6: ICD-10-CM

## 2018-09-26 DIAGNOSIS — S00.03XA: ICD-10-CM
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
## 2019-01-24 ENCOUNTER — HOSPITAL ENCOUNTER (EMERGENCY)
Dept: HOSPITAL 56 - MW.ED | Age: 24
Discharge: HOME | End: 2019-01-24
Payer: COMMERCIAL

## 2019-01-24 VITALS — DIASTOLIC BLOOD PRESSURE: 67 MMHG | SYSTOLIC BLOOD PRESSURE: 116 MMHG

## 2019-01-24 DIAGNOSIS — Z88.8: ICD-10-CM

## 2019-01-24 DIAGNOSIS — Z88.6: ICD-10-CM

## 2019-01-24 DIAGNOSIS — M54.9: Primary | ICD-10-CM

## 2019-01-24 DIAGNOSIS — G89.29: ICD-10-CM

## 2019-01-24 DIAGNOSIS — F41.9: ICD-10-CM

## 2019-01-24 DIAGNOSIS — Z91.040: ICD-10-CM

## 2019-01-24 DIAGNOSIS — Z91.018: ICD-10-CM

## 2019-01-24 DIAGNOSIS — Z79.899: ICD-10-CM

## 2019-01-24 DIAGNOSIS — F32.9: ICD-10-CM

## 2019-01-24 PROCEDURE — 96372 THER/PROPH/DIAG INJ SC/IM: CPT

## 2019-01-24 PROCEDURE — 99283 EMERGENCY DEPT VISIT LOW MDM: CPT

## 2019-01-24 NOTE — EDM.PDOC
ED HPI GENERAL MEDICAL PROBLEM





- General


Chief Complaint: Back Pain or Injury


Stated Complaint: BACK PAIN


Time Seen by Provider: 01/24/19 12:53


Source of Information: Reports: Patient


History Limitations: Reports: No Limitations





- History of Present Illness


INITIAL COMMENTS - FREE TEXT/NARRATIVE: 


HISTORY AND PHYSICAL:


Back Pain





History of present illness:


Patient is a 23-year-old female who presents to the emergency room today with 

complaints of back pain x 3 days. She has a history of chronic back pain and 

scoliosis. States that the pain has been ongoing and progressively getting 

worse over the past 3 days. . Does not radiate into the gluteus or lower 

extremities. Denies any injury, trauma or falls. Denies any impingement on 

range of motion. No numbness or tingling to the distal extremities.


She denies any fever, chills, chest pain, shortness of breath or cough. Denies 

any abdominal pain, nausea, vomiting, diarrhea, constipation or dysuria. She 

has been eating and drinking appropriately. Denies any chance of pregnancy.





Review of systems: 


As per history of present illness and below otherwise all systems reviewed and 

negative.





Past medical history: 


As per history of present illness and as reviewed below otherwise 

noncontributory.





Surgical history: 


As per history of present illness and as reviewed below otherwise 

noncontributory.





Social history: 


See social history for further information





Family history: 


As per history of present illness and as reviewed below otherwise 

noncontributory.





Physical exam:


General: Well-developed and well-nourished 23-year-old female. Alert and 

oriented. Nontoxic appearing and in no acute distress.


HEENT: Atraumatic, normocephalic, pupils equal and reactive bilaterally, 

negative for conjunctival pallor or scleral icterus, mucous membranes moist, 

TMs normal bilaterally, throat clear, neck supple, nontender, trachea midline. 

No drooling or trismus noted. No meningeal signs. No hot potato voice noted. 


Lungs: Clear to auscultation, breath sounds equal bilaterally, chest nontender.


Heart: S1S2, regular rate and rhythm without overt murmur


Abdomen: Soft, nondistended, nontender. Negative for masses or 

hepatosplenomegaly. Negative for costovertebral tenderness.


Pelvis: Stable nontender.


Genitourinary: Deferred.


Rectal: Deferred.


Skin: Intact, warm, dry. No lesions or rashes noted.


Extremities: Atraumatic, negative for cords or calf pain. Neurovascular 

unremarkable.


C-spine/Back: No pinpoint vertebral tenderness upon palpation. No crepitus, step

-offs or obvious deformities. She is ambulatory without any difficulty or 

deficits. She can walk on her heels and toes. She denies any urinary or fecal 

incontinence. Denies any numbness or tingling to the distal extremities.


Neuro: Awake, alert, oriented. Cranial nerves II through XII unremarkable. 

Cerebellum unremarkable. Motor and sensory unremarkable throughout. Exam 

nonfocal.





Notes:


This back pain is chronic but does have acute flareups intermittently. She 

states she does see a provider in [place]. Encouraged her to follow-up with her 

primary care provider for further evaluation and management. She voices 

understanding and is agreeable to plan of care. Denies any further questions or 

concerns at this time.





Diagnostics:


None





Therapeutics:


Toradol IM





Prescription:


Flexeril (#20)





Impression: 


Back Pain, acute on chronic





Plan:


1. Please take the medication as directed.


2. Gentle heat and stretching to the area.


3. Please follow-up with the primary care provider in the next 1-2 days. Return 

to the ED as needed and as discussed.





Definitive disposition and diagnosis as appropriate pending reevaluation and 

review of above.





  ** Back


Pain Score (Numeric/FACES): 10





- Related Data


 Allergies











Allergy/AdvReac Type Severity Reaction Status Date / Time


 


aspirin Allergy  Respiratory Verified 01/24/19 12:57





   Distress  


 


latex Allergy  Rash Verified 01/24/19 12:57


 


naproxen [From Aleve] Allergy  Respiratory Verified 01/24/19 12:57





   Distress  


 


mashroom Allergy  Respiratory Uncoded 01/24/19 12:57





   Distress  











Home Meds: 


 Home Meds





Amitriptyline [Elavil] 25 tab PO BEDTIME 12/21/17 [History]


FLUoxetine HCl [Fluoxetine] 10 mg PO DAILY 09/26/18 [History]











Past Medical History





- Past Health History


Medical/Surgical History: Denies Medical/Surgical History


HEENT History: Reports: None


Cardiovascular History: Reports: High Cholesterol


Respiratory History: Reports: Asthma


OB/GYN History: Reports: Other (See Below)


Other OB/GYN History: irreg. periods


Musculoskeletal History: Reports: Other (See Below)


Other Musculoskeletal History: scoliosis


Neurological History: Reports: None


Psychiatric History: Reports: Anxiety, Depression


Hematologic History: Reports: Other (See Below)


Other Hematologic History: Sickle cell trait, Factor 7, Factor 11 and factor 12 

deficiency


Immunologic History: Reports: None


Dermatologic History: Reports: None





- Infectious Disease History


Infectious Disease History: Reports: None





- Past Surgical History


Head Surgeries/Procedures: Reports: None





Social & Family History





- Family History


Family Medical History: Noncontributory





- Caffeine Use


Caffeine Use: Reports: Coffee, Soda





ED ROS GENERAL





- Review of Systems


Review Of Systems: ROS reveals no pertinent complaints other than HPI.





ED EXAM,LOWER BACK PAIN/INJURY





- Physical Exam


Exam: See Below (See dictation)





Course





- Vital Signs


Last Recorded V/S: 


 Last Vital Signs











Temp  97.4 F   01/24/19 12:58


 


Pulse  61   01/24/19 12:58


 


Resp  16   01/24/19 12:58


 


BP  113/43 L  01/24/19 12:58


 


Pulse Ox  99   01/24/19 12:58














- Orders/Labs/Meds


Orders: 


 Active Orders 24 hr











 Category Date Time Status


 


 Ketorolac [Toradol] Med  01/24/19 13:08 Once





 60 mg IM ONETIME ONE   








 Medication Orders





Ketorolac Tromethamine (Toradol)  60 mg IM ONETIME ONE


   Stop: 01/24/19 13:09








Meds: 


Medications











Generic Name Dose Route Start Last Admin





  Trade Name Kal  PRN Reason Stop Dose Admin


 


Ketorolac Tromethamine  60 mg  01/24/19 13:08  





  Toradol  IM  01/24/19 13:09  





  ONETIME ONE   





     





     





     





     














Departure





- Departure


Time of Disposition: 13:07


Disposition: Home, Self-Care 01


Clinical Impression: 


Back pain


Qualifiers:


 Back pain location: back pain in unspecified location Chronicity: unspecified 

Back pain laterality: bilateral Qualified Code(s): M54.9 - Dorsalgia, 

unspecified








- Discharge Information


Instructions:  Back Pain, Adult


Referrals: 


PCP,None [Primary Care Provider] - 


Forms:  ED Department Discharge


Additional Instructions: 


The following information is given to patients seen in the emergency department 

who are being discharged to home. This information is to outline your options 

for follow-up care. We provide all patients seen in our emergency department 

with a follow-up referral.





The need for follow-up, as well as the timing and circumstances, are variable 

depending upon the specifics of your emergency department visit.





If you don't have a primary care physician on staff, we will provide you with a 

referral. We always advise you to contact your personal physician following an 

emergency department visit to inform them of the circumstance of the visit and 

for follow-up with them and/or the need for any referrals to a consulting 

specialist.





The emergency department will also refer you to a specialist when appropriate. 

This referral assures that you have the opportunity for follow-up care with a 

specialist. All of these measure are taken in an effort to provide you with 

optimal care, which includes your follow-up.





Under all circumstances we always encourage you to contact your private 

physician who remains a resource for coordinating your care. When calling for 

follow-up care, please make the office aware that this follow-up is from your 

recent emergency room visit. If for any reason you are refused follow-up, 

please contact the [facility] Emergency 

Department at [phone] and asked to speak to the emergency department 

charge nurse.





[facility]


Primary Care


[address] [place] [address]


Phone: [phone]


Fax: [phone]





[facility]


[address] [place] [address]


Phone: [phone]


Fax: [phone]





1. Please take the medication as directed.


2. Gentle heat and stretching to the area.


3. Please follow-up with the primary care provider in the next 1-2 days. Return 

to the ED as needed and as discussed.





- My Orders


Last 24 Hours: 


My Active Orders





01/24/19 13:08


Ketorolac [Toradol]   60 mg IM ONETIME ONE 














- Assessment/Plan


Last 24 Hours: 


My Active Orders





01/24/19 13:08


Ketorolac [Toradol]   60 mg IM ONETIME ONE

## 2019-04-06 ENCOUNTER — HOSPITAL ENCOUNTER (EMERGENCY)
Dept: HOSPITAL 56 - MW.ED | Age: 24
Discharge: HOME | End: 2019-04-06
Payer: COMMERCIAL

## 2019-04-06 VITALS — DIASTOLIC BLOOD PRESSURE: 79 MMHG | SYSTOLIC BLOOD PRESSURE: 135 MMHG

## 2019-04-06 DIAGNOSIS — Z79.899: ICD-10-CM

## 2019-04-06 DIAGNOSIS — N39.0: ICD-10-CM

## 2019-04-06 DIAGNOSIS — G43.009: Primary | ICD-10-CM

## 2019-04-06 DIAGNOSIS — J45.909: ICD-10-CM

## 2019-04-06 DIAGNOSIS — Z91.040: ICD-10-CM

## 2019-04-06 DIAGNOSIS — Z88.6: ICD-10-CM

## 2019-04-06 DIAGNOSIS — F32.9: ICD-10-CM

## 2019-04-06 DIAGNOSIS — F41.9: ICD-10-CM

## 2019-04-06 DIAGNOSIS — Z91.018: ICD-10-CM

## 2019-04-06 DIAGNOSIS — Z88.8: ICD-10-CM

## 2019-04-06 DIAGNOSIS — N76.0: ICD-10-CM

## 2019-04-06 PROCEDURE — 81025 URINE PREGNANCY TEST: CPT

## 2019-04-06 PROCEDURE — 87660 TRICHOMONAS VAGIN DIR PROBE: CPT

## 2019-04-06 PROCEDURE — 87480 CANDIDA DNA DIR PROBE: CPT

## 2019-04-06 PROCEDURE — 99283 EMERGENCY DEPT VISIT LOW MDM: CPT

## 2019-04-06 PROCEDURE — 87086 URINE CULTURE/COLONY COUNT: CPT

## 2019-04-06 PROCEDURE — 81001 URINALYSIS AUTO W/SCOPE: CPT

## 2019-04-06 PROCEDURE — 96372 THER/PROPH/DIAG INJ SC/IM: CPT

## 2019-04-06 PROCEDURE — 87088 URINE BACTERIA CULTURE: CPT

## 2019-04-06 PROCEDURE — 87186 SC STD MICRODIL/AGAR DIL: CPT

## 2019-04-06 PROCEDURE — 87510 GARDNER VAG DNA DIR PROBE: CPT

## 2019-04-06 NOTE — EDM.PDOC
ED HPI GENERAL MEDICAL PROBLEM





- General


Chief Complaint: ENT Problem


Stated Complaint: HEADACHE AND EARACHE


Time Seen by Provider: 04/06/19 16:25


Source of Information: Reports: Patient


History Limitations: Reports: No Limitations





- History of Present Illness


INITIAL COMMENTS - FREE TEXT/NARRATIVE: 


HISTORY AND PHYSICAL:





History of present illness:


Patient is a 23-year-old female who presents to the emergency room with 

complaints of sinus pressure, headache and left ear pain. She states she does 

take amitriptyline once every evening for her migraine headaches, but over the 

past 2 days has not felt any relief with this medication. She also is concerned 

of some vaginal discharge, she does have a history of bacterial vaginosis. She 

states she recently was checked for gonorrhea and chlamydia, both were 

negative. She declines the desire to be rechecked for these. 





Patient denies any fever, chills, headache, change in vision, syncope or near 

syncope. Denies any chest pain, back pain, shortness of breath or cough. Denies 

any current abdominal pain, nausea, vomiting, diarrhea, constipation or 

dysuria. Denies any chance of pregnancy. Has not noted any blood in urine or 

stool. Patient has been eating and drinking appropriately.





Review of systems: 


As per history of present illness and below otherwise all systems reviewed and 

negative.





Past medical history: 


As per history of present illness and as reviewed below otherwise 

noncontributory.





Surgical history: 


As per history of present illness and as reviewed below otherwise 

noncontributory.





Social history: 


See social history for further information





Family history: 


As per history of present illness and as reviewed below otherwise 

noncontributory.





Physical exam:


General: Well-developed and well-nourished 23-year-old -American female. 

Alert and oriented. Nontoxic appearing and in no acute distress.


HEENT: Atraumatic, normocephalic, pupils equal and reactive bilaterally, 

negative for conjunctival pallor or scleral icterus, mucous membranes moist, 

TMs normal bilaterally, throat clear, neck supple, nontender, trachea midline. 

No drooling or trismus noted. No meningeal signs. No hot potato voice noted. 


Lungs: Clear to auscultation, breath sounds equal bilaterally, chest nontender.


Heart: S1S2, regular rate and rhythm without overt murmur


Abdomen: Soft, nondistended, nontender. Negative for masses or 

hepatosplenomegaly. Negative for costovertebral tenderness.


Pelvis: Stable nontender.


Genitourinary: Deferred.


Rectal: Deferred.


Skin: Intact, warm, dry. No lesions or rashes noted.


Extremities: Atraumatic, moves all extremities per self with difficulty or 

deficits, negative for cords or calf pain. Neurovascular unremarkable.


Neuro: Awake, alert, oriented. Cranial nerves II through XII unremarkable. 

Cerebellum unremarkable. Motor and sensory unremarkable throughout. Exam 

nonfocal.





Notes:


Pelvic exam was offered. Patient declined. She will allow swab to assess for 

BV. 


Patient does have a urinary tract infection along with bacterial vaginosis. We'

ll treat with Flagyl and nitrofurantoin. Patient does have some headache 

improvement. Encouraged her to take allergy/sinus OTC medication for head cold/

headache. Supportive care measures were reviewed and discussed. Voices 

understanding and is agreeable to plan of care. Denies any further questions or 

concerns at this time.





Diagnostics:


UA, Tric/Gabrielle/Cand





Therapeutics:


Imitrex SubQ





Prescription:


Nitrofurantoin


Flagyl Vaginal Supos.





Impression: 


Migraine headache


Bacterial vaginosis


UTI





Plan:


1. Please take the medications as prescribed. Abstain from any sexual activity 

while taking the Flagyl vaginal suppository (BV treatement).


2. Please use Tylenol and/or Ibuprofen as needed for migraine management. 


3. Get plenty of Rest. Encourage fluids to prevent dehydration. 


4. Please follow up with your primary care provider. Return to the ED as needed 

as discussed. 





Definitive disposition and diagnosis as appropriate pending reevaluation and 

review of above.





  ** Left Ear


Pain Score (Numeric/FACES): 9





- Related Data


 Allergies











Allergy/AdvReac Type Severity Reaction Status Date / Time


 


aspirin Allergy  Respiratory Verified 04/06/19 16:01





   Distress  


 


latex Allergy  Rash Verified 04/06/19 16:01


 


naproxen [From Aleve] Allergy  Respiratory Verified 04/06/19 16:01





   Distress  


 


mashroom Allergy  Respiratory Uncoded 04/06/19 16:01





   Distress  











Home Meds: 


 Home Meds





Amitriptyline [Elavil] 25 tab PO BEDTIME 12/21/17 [History]


FLUoxetine HCl [Fluoxetine] 10 mg PO DAILY 09/26/18 [History]


Cyclobenzaprine [Flexeril] 10 mg PO TID PRN #20 tab 01/24/19 [Rx]











Past Medical History





- Past Health History


Medical/Surgical History: Denies Medical/Surgical History


HEENT History: Reports: None


Cardiovascular History: Reports: High Cholesterol


Respiratory History: Reports: Asthma


Gastrointestinal History: Reports: None


Genitourinary History: Reports: None


OB/GYN History: Reports: Other (See Below)


Other OB/GYN History: irreg. periods


Musculoskeletal History: Reports: Other (See Below)


Other Musculoskeletal History: scoliosis


Neurological History: Reports: None


Psychiatric History: Reports: Anxiety, Depression


Endocrine/Metabolic History: Reports: None


Hematologic History: Reports: Other (See Below)


Other Hematologic History: Sickle cell trait, Factor 7, Factor 11 and factor 12 

deficiency


Immunologic History: Reports: None


Oncologic (Cancer) History: Reports: None


Dermatologic History: Reports: None





- Infectious Disease History


Infectious Disease History: Reports: Chicken Pox





- Past Surgical History


Head Surgeries/Procedures: Reports: None





Social & Family History





- Family History


Family Medical History: Noncontributory





- Tobacco Use


Smoking Status *Q: Never Smoker





- Caffeine Use


Caffeine Use: Reports: Coffee





- Recreational Drug Use


Recreational Drug Use: No





ED ROS ENT





- Review of Systems


Review Of Systems: ROS reveals no pertinent complaints other than HPI.





ED EXAM, ENT





- Physical Exam


Exam: See Below (See dictation)





Course





- Vital Signs


Last Recorded V/S: 


 Last Vital Signs











Temp  98.3 F   04/06/19 16:01


 


Pulse  89   04/06/19 16:01


 


Resp  16   04/06/19 16:01


 


BP  135/79   04/06/19 16:01


 


Pulse Ox  98   04/06/19 16:01














- Orders/Labs/Meds


Orders: 


 Active Orders 24 hr











 Category Date Time Status


 


 CULTURE URINE [RM] Stat Lab  04/06/19 17:02 Received











Labs: 


 Laboratory Tests











  04/06/19 04/06/19 04/06/19 Range/Units





  17:00 17:02 17:02 


 


Urine Color   YELLOW   


 


Urine Appearance   CLEAR   


 


Urine pH   6.5   (5.0-8.0)  


 


Ur Specific Gravity   1.015   (1.001-1.035)  


 


Urine Protein   NEGATIVE   (NEGATIVE)  mg/dL


 


Urine Glucose (UA)   NEGATIVE   (NEGATIVE)  mg/dL


 


Urine Ketones   NEGATIVE   (NEGATIVE)  mg/dL


 


Urine Occult Blood   NEGATIVE   (NEGATIVE)  


 


Urine Nitrite   POSITIVE H   (NEGATIVE)  


 


Urine Bilirubin   NEGATIVE   (NEGATIVE)  


 


Urine Urobilinogen   0.2   (<2.0)  EU/dL


 


Ur Leukocyte Esterase   MODERATE H   (NEGATIVE)  


 


Urine RBC   0-2   (0-2/HPF)  


 


Urine WBC   10-15   (0-5/HPF)  


 


Ur Epithelial Cells   MODERATE   (NONE-FEW)  


 


Urine Bacteria   1+ H   (NEGATIVE)  


 


Urine HCG, Qual    NEGATIVE  (NEGATIVE)  


 


Candida species DNA  NEGATIVE    (NEGATIVE)  


 


Gardnerella DNA Probe  POSITIVE H    (NEGATIVE)  


 


Trichomonas DNA Probe  NEGATIVE    (NEGATIVE)  











Meds: 


Medications














Discontinued Medications














Generic Name Dose Route Start Last Admin





  Trade Name Freq  PRN Reason Stop Dose Admin


 


Sumatriptan Succinate  6 mg  04/06/19 16:51  04/06/19 17:14





  Imitrex  SUBCUT  04/06/19 16:52  6 mg





  ONETIME ONE   Administration





     





     





     





     














Departure





- Departure


Time of Disposition: 18:36


Disposition: Home, Self-Care 01


Clinical Impression: 


 Bacterial vaginitis





UTI (urinary tract infection)


Qualifiers:


 Urinary tract infection type: site unspecified Hematuria presence: without 

hematuria Qualified Code(s): N39.0 - Urinary tract infection, site not specified





Migraine headache


Qualifiers:


 Migraine type: without aura Status migrainosus presence: without status 

migrainosus Intractability: not intractable Qualified Code(s): G43.009 - 

Migraine without aura, not intractable, without status migrainosus








- Discharge Information


Instructions:  Bacterial Vaginosis, Urinary Tract Infection, Adult, Easy-to-Read


Referrals: 


PCP,None [Primary Care Provider] - 


Forms:  ED Department Discharge


Additional Instructions: 


The following information is given to patients seen in the emergency department 

who are being discharged to home. This information is to outline your options 

for follow-up care. We provide all patients seen in our emergency department 

with a follow-up referral.





The need for follow-up, as well as the timing and circumstances, are variable 

depending upon the specifics of your emergency department visit.





If you don't have a primary care physician on staff, we will provide you with a 

referral. We always advise you to contact your personal physician following an 

emergency department visit to inform them of the circumstance of the visit and 

for follow-up with them and/or the need for any referrals to a consulting 

specialist.





The emergency department will also refer you to a specialist when appropriate. 

This referral assures that you have the opportunity for follow-up care with a 

specialist. All of these measure are taken in an effort to provide you with 

optimal care, which includes your follow-up.





Under all circumstances we always encourage you to contact your private 

physician who remains a resource for coordinating your care. When calling for 

follow-up care, please make the office aware that this follow-up is from your 

recent emergency room visit. If for any reason you are refused follow-up, 

please contact the [facility] Emergency 

Department at [phone] and asked to speak to the emergency department 

charge nurse.





[facility]


Primary Care


[address] [place] [id number]


Phone: [phone]


Fax: [phone]





[facility]


[address] [place] [address]


Phone: [phone]


Fax: [phone]





1. Please take the medications as prescribed. Abstain from any sexual activity 

while taking the Flagyl vaginal suppository (BV treatement).


2. Please use Tylenol and/or Ibuprofen as needed for migraine management. 


3. Get plenty of Rest. Encourage fluids to prevent dehydration. 


4. Please follow up with your primary care provider. Return to the ED as needed 

as discussed. 








- My Orders


Last 24 Hours: 


My Active Orders





04/06/19 17:02


CULTURE URINE [RM] Stat 














- Assessment/Plan


Last 24 Hours: 


My Active Orders





04/06/19 17:02


CULTURE URINE [RM] Stat

## 2019-08-19 ENCOUNTER — HOSPITAL ENCOUNTER (EMERGENCY)
Dept: HOSPITAL 56 - MW.ED | Age: 24
Discharge: HOME | End: 2019-08-19
Payer: COMMERCIAL

## 2019-08-19 VITALS — DIASTOLIC BLOOD PRESSURE: 71 MMHG | SYSTOLIC BLOOD PRESSURE: 121 MMHG

## 2019-08-19 DIAGNOSIS — F41.9: ICD-10-CM

## 2019-08-19 DIAGNOSIS — M41.9: ICD-10-CM

## 2019-08-19 DIAGNOSIS — Z91.018: ICD-10-CM

## 2019-08-19 DIAGNOSIS — Z91.040: ICD-10-CM

## 2019-08-19 DIAGNOSIS — F32.9: ICD-10-CM

## 2019-08-19 DIAGNOSIS — M62.830: Primary | ICD-10-CM

## 2019-08-19 DIAGNOSIS — N39.0: ICD-10-CM

## 2019-08-19 DIAGNOSIS — Z88.6: ICD-10-CM

## 2019-08-19 DIAGNOSIS — Z79.899: ICD-10-CM

## 2019-08-19 DIAGNOSIS — E78.00: ICD-10-CM

## 2019-08-19 PROCEDURE — 72082 X-RAY EXAM ENTIRE SPI 2/3 VW: CPT

## 2019-08-19 PROCEDURE — 99283 EMERGENCY DEPT VISIT LOW MDM: CPT

## 2019-08-19 PROCEDURE — 96372 THER/PROPH/DIAG INJ SC/IM: CPT

## 2019-08-19 PROCEDURE — 87086 URINE CULTURE/COLONY COUNT: CPT

## 2019-08-19 PROCEDURE — 81025 URINE PREGNANCY TEST: CPT

## 2019-08-19 PROCEDURE — 87186 SC STD MICRODIL/AGAR DIL: CPT

## 2019-08-19 PROCEDURE — 87088 URINE BACTERIA CULTURE: CPT

## 2019-08-19 PROCEDURE — 81001 URINALYSIS AUTO W/SCOPE: CPT

## 2019-08-19 NOTE — CR
INDICATION:



Back pain 



COMPARISON:



None available. 



TECHNIQUE:



AP and lateral views of the entire spine are obtained in the upright 

position on a long cassette. 



FINDINGS:



There is a mild C-shaped scoliosis of the upper lumbar spine convex towards 

the right with the apex at the L1-2 level. There is 12 degrees of 

curvature. 



The vertebral bodies are normal in appearance with no dysmorphic features.



There is no sign of pelvic tilt, with the femoral heads in symmetric 

position. 



The visualized chest is clear and the visualized abdominal bowel gas 

pattern is normal. 



IMPRESSION:



Mild C-shaped scoliosis of the upper lumbar spine with 12 degrees of 

curvature convex towards the right with the apex at the L2-3 level. 



No sign of any other abnormality in the thoracic or lumbar spines.



Dictated by Tyrese Oates MD @ Aug 19 2019  9:31PM



Signed by Dr. Tyrese Oates @ Aug 19 2019  9:35PM

## 2019-08-19 NOTE — EDM.PDOC
ED HPI GENERAL MEDICAL PROBLEM





- General


Chief Complaint: Back Pain or Injury


Stated Complaint: BACK SPASMS


Time Seen by Provider: 08/19/19 19:29


Source of Information: Reports: Patient





- History of Present Illness


INITIAL COMMENTS - FREE TEXT/NARRATIVE: 





HISTORY AND PHYSICAL:


History of present illness:


[Patient presents with back pain, history of back pain and scoliosis 6 out of 

10 nonradiating pain worsened with palpation of paraspinous muscles no fever 

nausea vomiting chills sweats





No imaging on file





]


Review of systems: 


As per history of present illness and below otherwise all systems reviewed and 

negative.


Past medical history: 


As per history of present illness and as reviewed below otherwise 

noncontributory.


Surgical history: 


As per history of present illness and as reviewed below otherwise 

noncontributory.


Social history: 


No reported history of drug or alcohol abuse.


Family history: 


As per history of present illness and as reviewed below otherwise 

noncontributory.


Physical exam:


HEENT: Atraumatic, normocephalic, pupils reactive, negative for conjunctival 

pallor or scleral icterus, mucous membranes moist, throat clear, neck supple, 

nontender, trachea midline.


Lungs: Clear to auscultation, breath sounds equal bilaterally, chest nontender.


Heart: S1S2, regular, negative for clicks, rubs, or JVD.


Abdomen: Soft, nondistended, nontender. Negative for masses or 

hepatosplenomegaly. Negative for costovertebral tenderness.


Pelvis: Stable nontender.


Genitourinary: Deferred.


Rectal: Deferred.


Extremities: Atraumatic, negative for cords or calf pain. Neurovascular 

unremarkable.


Neuro: Awake, alert, oriented. Cranial nerves II through XII unremarkable. 

Cerebellum unremarkable. Motor and sensory unremarkable throughout. Exam 

nonfocal.


Muscle/skeletal scoliosis noted, paraspinous muscles spasm noted greater than 

left him otherwise unremarkable





Diagnostics:


[UA, hCG


 spinal views





Therapeutics:


[Toradol 60 IM


Continue Flexeril as prescribed by your provider


Toradol 10 mg #15 no refill


Heat or ice whichever gains most benefit


Wear back brace patient is noncompliant with this


Follow-up with primary care at [facility] -2 weeks sooner as needed


Cipro


]


Impression: 


[Muscle spasm


UTI


History of scoliosis


]


Definitive disposition and diagnosis as appropriate pending reevaluation and 

review of above.


  ** Bilateral Middle Back


Pain Score (Numeric/FACES): 8





- Related Data


 Allergies











Allergy/AdvReac Type Severity Reaction Status Date / Time


 


aspirin Allergy  Respiratory Verified 08/19/19 19:10





   Distress  


 


latex Allergy  Rash Verified 08/19/19 19:10


 


naproxen [From Aleve] Allergy  Respiratory Verified 08/19/19 19:10





   Distress  


 


mashroom Allergy  Respiratory Uncoded 08/19/19 19:10





   Distress  











Home Meds: 


 Home Meds





FLUoxetine HCl [Fluoxetine] 10 mg PO DAILY 09/26/18 [History]


Cyclobenzaprine [Flexeril] 10 mg PO TID PRN #20 tab 01/24/19 [Rx]











Past Medical History





- Past Health History


Medical/Surgical History: Denies Medical/Surgical History


HEENT History: Reports: None


Cardiovascular History: Reports: High Cholesterol


Respiratory History: Reports: Asthma


Gastrointestinal History: Reports: None


Genitourinary History: Reports: None


OB/GYN History: Reports: Other (See Below)


Other OB/GYN History: irreg. periods


Musculoskeletal History: Reports: Other (See Below)


Other Musculoskeletal History: scoliosis


Neurological History: Reports: None


Psychiatric History: Reports: Anxiety, Depression


Endocrine/Metabolic History: Reports: None


Hematologic History: Reports: Other (See Below)


Other Hematologic History: Sickle cell trait, Factor 7, Factor 11 and factor 12 

deficiency


Immunologic History: Reports: None


Oncologic (Cancer) History: Reports: None


Dermatologic History: Reports: None





- Infectious Disease History


Infectious Disease History: Reports: Chicken Pox





- Past Surgical History


Head Surgeries/Procedures: Reports: None


HEENT Surgical History: Reports: Adenoidectomy, Tonsillectomy





Social & Family History





- Family History


Family Medical History: Noncontributory





- Tobacco Use


Smoking Status *Q: Never Smoker





- Caffeine Use


Caffeine Use: Reports: Coffee, Energy Drinks, Soda, Tea





- Recreational Drug Use


Recreational Drug Use: Yes


Recreational Drug Type: Reports: Marijuana/Hashish





ED ROS GENERAL





- Review of Systems


Review Of Systems: See Below





ED EXAM, GENERAL





- Physical Exam


Exam: See Below





Course





- Vital Signs


Last Recorded V/S: 


 Last Vital Signs











Temp  97.8 F   08/19/19 19:11


 


Pulse  86   08/19/19 19:11


 


Resp  16   08/19/19 19:11


 


BP  121/71   08/19/19 19:11


 


Pulse Ox  100   08/19/19 19:11














- Orders/Labs/Meds


Orders: 


 Active Orders 24 hr











 Category Date Time Status


 


 Spine Scoliosis Stand 2V or 3V [CR] Stat Exams  08/19/19 19:28 Taken


 


 CULTURE URINE [RM] Stat Lab  08/19/19 19:40 Received











Labs: 


 Laboratory Tests











  08/19/19 08/19/19 Range/Units





  19:40 19:40 


 


Urine Color  YELLOW   


 


Urine Appearance  SLT CLOUDY   


 


Urine pH  7.0   (5.0-8.0)  


 


Ur Specific Gravity  1.020   (1.001-1.035)  


 


Urine Protein  NEGATIVE   (NEGATIVE)  mg/dL


 


Urine Glucose (UA)  NEGATIVE   (NEGATIVE)  mg/dL


 


Urine Ketones  NEGATIVE   (NEGATIVE)  mg/dL


 


Urine Occult Blood  NEGATIVE   (NEGATIVE)  


 


Urine Nitrite  POSITIVE H   (NEGATIVE)  


 


Urine Bilirubin  NEGATIVE   (NEGATIVE)  


 


Urine Urobilinogen  0.2   (<2.0)  EU/dL


 


Ur Leukocyte Esterase  NEGATIVE   (NEGATIVE)  


 


Urine RBC  0-1   (0-2/HPF)  


 


Urine WBC  0-1   (0-5/HPF)  


 


Ur Epithelial Cells  OCCASIONAL   (NONE-FEW)  


 


Urine Bacteria  3+ H   (NEGATIVE)  


 


Urine HCG, Qual   NEGATIVE  (NEGATIVE)  











Meds: 


Medications














Discontinued Medications














Generic Name Dose Route Start Last Admin





  Trade Name Freq  PRN Reason Stop Dose Admin


 


Ketorolac Tromethamine  60 mg  08/19/19 19:27  08/19/19 19:30





  Toradol  IM  08/19/19 19:28  60 mg





  ONETIME ONE   Administration





     





     





     





     














Departure





- Departure


Time of Disposition: 21:18


Disposition: Home, Self-Care 01


Condition: Good


Clinical Impression: 


 History of scoliosis, Muscle spasm





UTI (urinary tract infection)


Qualifiers:


 Urinary tract infection type: site unspecified Hematuria presence: without 

hematuria Qualified Code(s): N39.0 - Urinary tract infection, site not specified








- Discharge Information


Referrals: 


Smiley Torres PA [Primary Care Provider] - 


Forms:  ED Department Discharge


Additional Instructions: 


The following information is given to patients seen in the emergency department 

who are being discharged to home. This information is to outline your options 

for follow-up care. We provide all patients seen in our emergency department 

with a follow-up referral.





The need for follow-up, as well as the timing and circumstances, are variable 

depending upon the specifics of your emergency department visit.





If you don't have a primary care physician on staff, we will provide you with a 

referral. We always advise you to contact your personal physician following an 

emergency department visit to inform them of the circumstance of the visit and 

for follow-up with them and/or the need for any referrals to a consulting 

specialist.





The emergency department will also refer you to a specialist when appropriate. 

This referral assures that you have the opportunity for follow-up care with a 

specialist. All of these measure are taken in an effort to provide you with 

optimal care, which includes your follow-up.





Under all circumstances we always encourage you to contact your private 

physician who remains a resource for coordinating your care. When calling for 

follow-up care, please make the office aware that this follow-up is from your 

recent emergency room visit. If for any reason you are refused follow-up, 

please contact the [facility] emergency department at [phone] 

and asked to speak to the emergency department charge nurse.








- My Orders


Last 24 Hours: 


My Active Orders





08/19/19 19:28


Spine Scoliosis Stand 2V or 3V [CR] Stat 





08/19/19 19:40


CULTURE URINE [RM] Stat 














- Assessment/Plan


Last 24 Hours: 


My Active Orders





08/19/19 19:28


Spine Scoliosis Stand 2V or 3V [CR] Stat 





08/19/19 19:40


CULTURE URINE [RM] Stat

## 2019-11-17 ENCOUNTER — HOSPITAL ENCOUNTER (EMERGENCY)
Dept: HOSPITAL 56 - MW.ED | Age: 24
Discharge: HOME | End: 2019-11-17
Payer: COMMERCIAL

## 2019-11-17 VITALS — HEART RATE: 80 BPM | SYSTOLIC BLOOD PRESSURE: 101 MMHG | DIASTOLIC BLOOD PRESSURE: 50 MMHG

## 2019-11-17 DIAGNOSIS — O98.811: Primary | ICD-10-CM

## 2019-11-17 DIAGNOSIS — Z3A.01: ICD-10-CM

## 2019-11-17 DIAGNOSIS — B37.9: ICD-10-CM

## 2019-11-17 NOTE — EDM.PDOC
ED HPI GENERAL MEDICAL PROBLEM





- General


Chief Complaint: Genitourinary Problem


Stated Complaint: YEAST INFECTION, PREGNANT


Time Seen by Provider: 19 13:14


Source of Information: Reports: Patient


History Limitations: Reports: No Limitations





- History of Present Illness


INITIAL COMMENTS - FREE TEXT/NARRATIVE: 


HISTORY AND PHYSICAL:





History of present illness:


Patient is a 24-year-old female who presents to the emergency room with 

complaints of vaginal itching, odor and discomfort. Patient states she was 

recently seen at the clinic in [facility] and was informed she was pregnant

, she has been trying to get pregnant and had undergone IUI in [facility]. She states prior to finding out she is pregnant she had been treated 

for a bacterial vaginosis although had no symptoms after treatment was 

completed. Over the past several days she has had labial itching and irritation 

and had started using Monistat over-the-counter which was directed by her OB/

GYN. She states now she has vaginal odor and discharge.





States she is 2 weeks and 4 days PG


OBGYN: Dr Nataliya Sharp at [facility] , P:0





Review of systems: 


As per history of present illness and below otherwise all systems reviewed and 

negative.





Past medical history: 


As per history of present illness and as reviewed below otherwise 

noncontributory.





Surgical history: 


As per history of present illness and as reviewed below otherwise 

noncontributory.





Social history: 


See social history for further information





Family history: 


As per history of present illness and as reviewed below otherwise 

noncontributory.





Physical exam:


General: Well developed and well nourished 24-year-old -American female. 

Alert and oriented. Nontoxic appearing and in no acute distress.


HEENT: Atraumatic, normocephalic, pupils equal and reactive bilaterally, 

negative for conjunctival pallor or scleral icterus, mucous membranes moist, 

TMs normal bilaterally, throat clear, neck supple, nontender, trachea midline. 

No drooling or trismus noted. No meningeal signs. No hot potato voice noted. 


Lungs: Clear to auscultation, breath sounds equal bilaterally, chest nontender.


Heart: S1S2, regular rate and rhythm without overt murmur


Abdomen: Soft, nondistended, nontender. Negative for masses or 

hepatosplenomegaly. Negative for costovertebral tenderness.


Pelvis: Stable nontender.


Genitourinary: This was done with consent and a chaperone at the bedside. An 

external vaginal exam was done. She does have some white thick discharge noted 

at the introitus. Vaginal swab was obtained for labs. 


Skin: Intact, warm, dry. No lesions or rashes noted.


Extremities: Atraumatic, moves all extremities per self without difficulty or 

deficits, negative for cords or calf pain. Neurovascular unremarkable.


Neuro: Awake, alert, oriented. Cranial nerves II through XII unremarkable. 

Cerebellum unremarkable. Motor and sensory unremarkable throughout. Exam 

nonfocal.





Notes:


Patient has a UTI and did test positive for bacterial vaginosis and 

candidiasis. I did attempt to get [facility] NP. They do not have anyone 

on call and their group. I contacted Lupe Bishop, nurse midwife  - OB/GYN 

on-call. She gave recommendations for medication administration. I discussed 

all findings with the patient and encouraged her to follow up with her OB/GYN, 

calling on Monday to set up a follow-up appointment. I also gave her Lupe Hussein's information in case she wanted to follow-up here. Patient vital signs 

are stable. Supportive care measures were reviewed and discussed. Voices 

understanding and is agreeable to plan of care. Denies any further questions or 

concerns at this time.





Diagnostics:


Gonorrhea/chlamydia, trichomonas/BV/candidiasis, UA





Therapeutics:


Macrobid, Flagyl





Prescription:


Macrobid, Flagyl





Impression: 


UTI in pregnancy


BV


Candidiasis





Plan:


1. Increase your oral fluids. Wear loose fitting clothing and cotton underwear.


2. Pelvic rest until cleared by your OBGYN (no sex etc...)


3. Take medications as directed


4. FOllow up with your OBGYN as we discussed. You may also follow up with 

Lupe Hussein Nurse Midwife. 


5. Return to the ED as needed as discussed. 








Definitive disposition and diagnosis as appropriate pending reevaluation and 

review of above.








- Related Data


 Allergies











Allergy/AdvReac Type Severity Reaction Status Date / Time


 


aspirin Allergy  Respiratory Verified 19 13:08





   Distress  


 


latex Allergy  Rash Verified 19 13:08


 


naproxen [From Aleve] Allergy  Respiratory Verified 19 13:08





   Distress  


 


mashroom Allergy  Respiratory Uncoded 19 13:08





   Distress  











Home Meds: 


 Home Meds





Prenatal Comb No.42/Folic Acid [Prena1 Chew Tablet] 1.4 mg PO ASDIRECTED  [History]


Progesterone, Micronized [Progesterone] 200 mg PO ASDIRECTED 19 [History]











Past Medical History





- Past Health History


Medical/Surgical History: Denies Medical/Surgical History


HEENT History: Reports: None


Cardiovascular History: Reports: High Cholesterol


Respiratory History: Reports: Asthma


Gastrointestinal History: Reports: None


Genitourinary History: Reports: None


OB/GYN History: Reports: Other (See Below)


Other OB/GYN History: irreg. periods


Musculoskeletal History: Reports: Other (See Below)


Other Musculoskeletal History: scoliosis


Neurological History: Reports: None


Psychiatric History: Reports: Anxiety, Depression


Endocrine/Metabolic History: Reports: None


Hematologic History: Reports: Other (See Below)


Other Hematologic History: Sickle cell trait, Factor 7, Factor 11 and factor 12 

deficiency


Immunologic History: Reports: None


Oncologic (Cancer) History: Reports: None


Dermatologic History: Reports: None





- Infectious Disease History


Infectious Disease History: Reports: Chicken Pox





- Past Surgical History


Head Surgeries/Procedures: Reports: None


HEENT Surgical History: Reports: Adenoidectomy, Tonsillectomy





Social & Family History





- Family History


Family Medical History: Noncontributory





- Tobacco Use


Smoking Status *Q: Never Smoker


Second Hand Smoke Exposure: No





- Caffeine Use


Caffeine Use: Reports: None





- Recreational Drug Use


Recreational Drug Use: No





ED ROS GENERAL





- Review of Systems


Review Of Systems: Comprehensive ROS is negative, except as noted in HPI.





ED EXAM, RENAL/





- Physical Exam


Exam: See Below (See dictation)





Course





- Vital Signs


Last Recorded V/S: 


 Last Vital Signs











Temp  97.7 F   19 13:09


 


Pulse  75   19 13:09


 


Resp  16   19 13:09


 


BP  113/69   19 13:09


 


Pulse Ox  99   19 13:09














- Orders/Labs/Meds


Orders: 


 Active Orders 24 hr











 Category Date Time Status


 


 CHLAMYDIA AND GONORRHEA BY TMA Stat Lab  19 14:00 Received


 


 CULTURE URINE [RM] Stat Lab  19 13:17 Received











Labs: 


 Laboratory Tests











  19 Range/Units





  13:17 14:00 


 


Urine Color  YELLOW   


 


Urine Appearance  HAZY   


 


Urine pH  6.5   (5.0-8.0)  


 


Ur Specific Gravity  1.015   (1.001-1.035)  


 


Urine Protein  NEGATIVE   (NEGATIVE)  mg/dL


 


Urine Glucose (UA)  NEGATIVE   (NEGATIVE)  mg/dL


 


Urine Ketones  NEGATIVE   (NEGATIVE)  mg/dL


 


Urine Occult Blood  NEGATIVE   (NEGATIVE)  


 


Urine Nitrite  POSITIVE H   (NEGATIVE)  


 


Urine Bilirubin  NEGATIVE   (NEGATIVE)  


 


Urine Urobilinogen  0.2   (<2.0)  EU/dL


 


Ur Leukocyte Esterase  SMALL H   (NEGATIVE)  


 


Urine RBC  0-2   (0-2/HPF)  


 


Urine WBC  3-6   (0-5/HPF)  


 


Ur Epithelial Cells  FEW   (NONE-FEW)  


 


Urine Bacteria  4+ H   (NEGATIVE)  


 


Hyaline Casts  L   (0-2/LPF)  


 


Candida species DNA   POSITIVE H  (NEGATIVE)  


 


Gardnerella DNA Probe   POSITIVE H  (NEGATIVE)  


 


Trichomonas DNA Probe   NEGATIVE  (NEGATIVE)  














Departure





- Departure


Time of Disposition: 15:34


Disposition: Home, Self-Care 01


Clinical Impression: 


 Bacterial vaginitis, Candidiasis





UTI in pregnancy


Qualifiers:


 Trimester: first trimester Qualified Code(s): O23.41 - Unspecified infection 

of urinary tract in pregnancy, first trimester








- Discharge Information


Instructions:  Bacterial Vaginosis, Easy-to-Read, Urinary Tract Infection, Adult

, Easy-to-Read


Referrals: 


Smiley Torres PA [Primary Care Provider] - 


Forms:  ED Department Discharge


Additional Instructions: 


The following information is given to patients seen in the emergency department 

who are being discharged to home. This information is to outline your options 

for follow-up care. We provide all patients seen in our emergency department 

with a follow-up referral.





The need for follow-up, as well as the timing and circumstances, are variable 

depending upon the specifics of your emergency department visit.





If you don't have a primary care physician on staff, we will provide you with a 

referral. We always advise you to contact your personal physician following an 

emergency department visit to inform them of the circumstance of the visit and 

for follow-up with them and/or the need for any referrals to a consulting 

specialist.





The emergency department will also refer you to a specialist when appropriate. 

This referral assures that you have the opportunity for follow-up care with a 

specialist. All of these measure are taken in an effort to provide you with 

optimal care, which includes your follow-up.





Under all circumstances we always encourage you to contact your private 

physician who remains a resource for coordinating your care. When calling for 

follow-up care, please make the office aware that this follow-up is from your 

recent emergency room visit. If for any reason you are refused follow-up, 

please contact the [facility] Emergency 

Department at [phone] and asked to speak to the emergency department 

charge nurse.





[facility]


Primary Care: Women's Health


[address] [place] [address]


Phone: [phone]


Fax: [phone]





[facility]


[address] [place] [id number]


Phone: [phone]


Fax: [phone]





1. Increase your oral fluids. Wear loose fitting clothing and cotton underwear.


2. Pelvic rest until cleared by your OBGYN (no sex etc...)


3. Take medications as directed


4. Follow up with your OBGYN as we discussed. You may also follow up with 

Lupe Hussein, Nurse Midwife. 


5. Return to the ED as needed as discussed. 





- My Orders


Last 24 Hours: 


My Active Orders





19 13:17


CULTURE URINE [RM] Stat 





19 14:00


CHLAMYDIA AND GONORRHEA BY TMA Stat 














- Assessment/Plan


Last 24 Hours: 


My Active Orders





19 13:17


CULTURE URINE [RM] Stat 





19 14:00


CHLAMYDIA AND GONORRHEA BY TMA Stat

## 2020-04-26 ENCOUNTER — HOSPITAL ENCOUNTER (EMERGENCY)
Dept: HOSPITAL 56 - MW.ED | Age: 25
Discharge: HOME | End: 2020-04-26
Payer: MEDICAID

## 2020-04-26 VITALS — DIASTOLIC BLOOD PRESSURE: 70 MMHG | SYSTOLIC BLOOD PRESSURE: 114 MMHG | HEART RATE: 80 BPM

## 2020-04-26 DIAGNOSIS — O23.42: ICD-10-CM

## 2020-04-26 DIAGNOSIS — O99.89: Primary | ICD-10-CM

## 2020-04-26 DIAGNOSIS — O99.012: ICD-10-CM

## 2020-04-26 DIAGNOSIS — O99.512: ICD-10-CM

## 2020-04-26 DIAGNOSIS — M41.9: ICD-10-CM

## 2020-04-26 DIAGNOSIS — R04.0: ICD-10-CM

## 2020-04-26 DIAGNOSIS — Z91.018: ICD-10-CM

## 2020-04-26 DIAGNOSIS — Z91.040: ICD-10-CM

## 2020-04-26 DIAGNOSIS — Z79.899: ICD-10-CM

## 2020-04-26 DIAGNOSIS — J45.909: ICD-10-CM

## 2020-04-26 DIAGNOSIS — Z88.6: ICD-10-CM

## 2020-04-26 DIAGNOSIS — Z3A.28: ICD-10-CM

## 2020-04-26 LAB
BUN SERPL-MCNC: 4 MG/DL (ref 7–18)
CHLORIDE SERPL-SCNC: 102 MMOL/L (ref 98–107)
CO2 SERPL-SCNC: 25.8 MMOL/L (ref 21–32)
GLUCOSE SERPL-MCNC: 98 MG/DL (ref 74–106)
POTASSIUM SERPL-SCNC: 3.7 MMOL/L (ref 3.5–5.1)
SODIUM SERPL-SCNC: 136 MMOL/L (ref 136–145)

## 2020-04-26 PROCEDURE — 87086 URINE CULTURE/COLONY COUNT: CPT

## 2020-04-26 PROCEDURE — 36415 COLL VENOUS BLD VENIPUNCTURE: CPT

## 2020-04-26 PROCEDURE — 80053 COMPREHEN METABOLIC PANEL: CPT

## 2020-04-26 PROCEDURE — 85025 COMPLETE CBC W/AUTO DIFF WBC: CPT

## 2020-04-26 PROCEDURE — 99284 EMERGENCY DEPT VISIT MOD MDM: CPT

## 2020-04-26 PROCEDURE — 85610 PROTHROMBIN TIME: CPT

## 2020-04-26 PROCEDURE — 81001 URINALYSIS AUTO W/SCOPE: CPT

## 2020-04-26 NOTE — EDM.PDOC
ED HPI GENERAL MEDICAL PROBLEM





- General


Chief Complaint: ENT Problem


Stated Complaint: COUGHIN UP BLOOD


Time Seen by Provider: 20 13:59


Source of Information: Reports: Patient


History Limitations: Reports: No Limitations





- History of Present Illness


INITIAL COMMENTS - FREE TEXT/NARRATIVE: 


HISTORY AND PHYSICAL:





History of present illness:


Patient is a 24-year-old female who presents to the emergency room with 

complaints of epistaxis.  She states approximately 1:00 she had a spontaneous 

nosebleed coming from both nares.  She called an ambulance to be evaluated as 

she was concerned, currently 28 weeks pregnant.  Prior to the ambulance 

arriving to her house her nosebleed had subsided.  She did have one episode of 

emesis prior to arrival.  She is here now stating she wanted to get "checked 

over".  Believes she has some sort of "blood disorder" but unsure of her 

diagnosis, and I am unable to find report of this in her previous ER records. 

She denies any injury or trauma.  Patient denies any fever, chills, headache, 

change in vision, syncope or near syncope. Denies any chest pain, back pain, 

shortness of breath or cough. Denies any abdominal pain/cramping, nausea, 

vomiting, diarrhea, constipation or dysuria. Denies any vaginal bleeding or 

pregnancy related concerns. Has not noted any blood in urine or stool. Patient 

has been eating and drinking appropriately.





Review of systems: 


As per history of present illness and below otherwise all systems reviewed and 

negative.





Past medical history: 


As per history of present illness and as reviewed below otherwise 

noncontributory.





Surgical history: 


As per history of present illness and as reviewed below otherwise 

noncontributory.





Social history: 


See social history for further information





Family history: 


As per history of present illness and as reviewed below otherwise 

noncontributory.





Physical exam:


General: Well-developed and well-nourished 24-year-old -American female.

  Alert and oriented.  Nontoxic-appearing and in no acute distress.


HEENT: Atraumatic, normocephalic, pupils equal and reactive bilaterally, 

negative for conjunctival pallor or scleral icterus, nares patent with no 

active bleeding noted, mucous membranes moist, TMs normal bilaterally, throat 

clear, neck supple, nontender, trachea midline. No drooling or trismus noted. 

No meningeal signs. No hot potato voice noted. 


Lungs: Clear to auscultation, breath sounds equal bilaterally, chest nontender.


Heart: S1S2, regular rate and rhythm without overt murmur


Abdomen: Soft,  abdomen, nontender. Negative for costovertebral 

tenderness.


Skin: Intact, warm, dry. No lesions or rashes noted.


Extremities: Atraumatic, moves all extremities per self without difficulty or 

deficits, negative for cords or calf pain. Neurovascular unremarkable.


Neuro: Awake, alert, oriented. Cranial nerves II through XII unremarkable. 

Cerebellum unremarkable. Motor and sensory unremarkable throughout. Exam 

nonfocal.





Notes:


Had patient blow nose forcefully with Afrin use, no blood noted in the tissue.  

Clamp applied for 15 minutes to assess after.  Patient was last seen earlier 

this month by her OB/GYN, everything has been uneventful.  She has a routine OB/

GYN appointment this Wednesday.  Today's fetal heart tones 137-145. VSS and lab 

work is pending.





Patient does have a leukocytosis although has no complaints that brought her 

into the emergency room other than her nosebleed.  H&H is low, she states this 

has been running lower over the past year and does see a hematologist for this.

  She does have a urinary tract infection, will treat with Keflex.  She states 

she would like to be discharged home as she has not had any epistaxis since 

being here in the emergency room.  I did request that she have her labs redrawn 

when she sees her OB/GYN on Wednesday.  She is agreeable.  Medication and 

supportive care measures were reviewed and discussed. Voices understanding and 

is agreeable to plan of care. Denies any further questions or concerns at this 

time.





Diagnostics:


CBC, CMP, UA, INR, Fetal Heart Tones





Therapeutics:


IV fluids





Prescription:


Keflex





Impression: 


UTI in pregnancy


Epistaxis


Anemia





Plan:


1. Avoid any strenuous activities or blowing your nose for the next 24 hours. 


2. Increase your oral fluids. Take the antibiotic as directed.


3. Tylenol as needed for pain.


4. Keep your OBGYN appointment as scheduled; would like you to show your labs 

that were done to day - may need to be repeated.


5. Return to the ED as needed and as discussed. 





Definitive disposition and diagnosis as appropriate pending reevaluation and 

review of above.








- Related Data


 Allergies











Allergy/AdvReac Type Severity Reaction Status Date / Time


 


aspirin Allergy  Respiratory Verified 20 14:02





   Distress  


 


latex Allergy  Rash Verified 20 14:02


 


naproxen [From Aleve] Allergy  Respiratory Verified 20 14:02





   Distress  


 


mashroom Allergy  Respiratory Uncoded 20 14:02





   Distress  











Home Meds: 


 Home Meds





Prenatal Comb No.42/Folic Acid [Prena1 Chew Tablet] 1.4 mg PO ASDIRECTED  [History]


cephALEXin [Keflex] 500 mg PO BID 7 Days #14 cap 20 [Rx]











Past Medical History





- Past Health History


Medical/Surgical History: Denies Medical/Surgical History


HEENT History: Reports: None


Cardiovascular History: Reports: High Cholesterol


Respiratory History: Reports: Asthma


Gastrointestinal History: Reports: None


Genitourinary History: Reports: None


OB/GYN History: Reports: Other (See Below)


Other OB/GYN History: irreg. periods


Musculoskeletal History: Reports: Other (See Below)


Other Musculoskeletal History: scoliosis


Neurological History: Reports: None


Psychiatric History: Reports: Anxiety, Depression


Endocrine/Metabolic History: Reports: None


Hematologic History: Reports: Other (See Below)


Other Hematologic History: Sickle cell trait, Factor 7, Factor 11 and factor 12 

deficiency


Immunologic History: Reports: None


Oncologic (Cancer) History: Reports: None


Dermatologic History: Reports: None





- Infectious Disease History


Infectious Disease History: Reports: Chicken Pox





- Past Surgical History


Head Surgeries/Procedures: Reports: None


HEENT Surgical History: Reports: Adenoidectomy, Tonsillectomy





Social & Family History





- Family History


Family Medical History: Noncontributory





- Tobacco Use


Smoking Status *Q: Never Smoker


Second Hand Smoke Exposure: No





- Caffeine Use


Caffeine Use: Reports: None





- Recreational Drug Use


Recreational Drug Use: No





ED ROS ENT





- Review of Systems


Review Of Systems: Comprehensive ROS is negative, except as noted in HPI.





ED EXAM, ENT





- Physical Exam


Exam: See Below (See dictation)





Course





- Vital Signs


Last Recorded V/S: 


 Last Vital Signs











Temp  98.2 F   20 13:58


 


Pulse  90   20 13:58


 


Resp  18   20 13:58


 


BP  124/71   20 13:58


 


Pulse Ox  98   20 13:58














- Orders/Labs/Meds


Orders: 


 Active Orders 24 hr











 Category Date Time Status


 


 Fetal Heart Rate [RC] Click to Edit Care  20 14:04 Active


 


 CULTURE URINE [RM] Stat Lab  20 14:39 Received











Labs: 


 Laboratory Tests











  20 Range/Units





  14:00 14:00 14:00 


 


WBC  17.78 H    (4.0-11.0)  K/uL


 


RBC  3.84 L    (4.30-5.90)  M/uL


 


Hgb  9.8 L    (12.0-16.0)  g/dL


 


Hct  30.2 L    (36.0-46.0)  %


 


MCV  78.6 L    (80.0-98.0)  fL


 


MCH  25.5 L    (27.0-32.0)  pg


 


MCHC  32.5    (31.0-37.0)  g/dL


 


RDW Std Deviation  39.1    (28.0-62.0)  fl


 


RDW Coeff of Virgil  14    (11.0-15.0)  %


 


Plt Count  300    (150-400)  K/uL


 


MPV  9.90    (7.40-12.00)  fL


 


Add Manual Diff  YES    


 


Neutrophils % (Manual)  82 H    (48.0-80.0)  %


 


Lymphocytes % (Manual)  10 L    (16.0-40.0)  %


 


Monocytes % (Manual)  7    (0.0-15.0)  %


 


Myelocytes %  1    %


 


Nucleated RBC %  0.0    /100WBC


 


Absolute Seg Neuts  14.6 H    (1.4-5.7)  


 


Lymphocytes # (Manual)  1.8    (0.6-2.4)  


 


Monocytes # (Manual)  1.2 H    (0.0-0.8)  


 


Absolute Myelocytes  0.2    


 


Nucleated RBCs #  0    K/uL


 


INR   0.98   


 


Sodium    136  (136-145)  mmol/L


 


Potassium    3.7  (3.5-5.1)  mmol/L


 


Chloride    102  ()  mmol/L


 


Carbon Dioxide    25.8  (21.0-32.0)  mmol/L


 


BUN    4 L  (7.0-18.0)  mg/dL


 


Creatinine    0.6  (0.6-1.0)  mg/dL


 


Est Cr Clr Drug Dosing    140.60  mL/min


 


Estimated GFR (MDRD)    > 60.0  ml/min


 


Glucose    98  ()  mg/dL


 


Calcium    8.6  (8.5-10.1)  mg/dL


 


Total Bilirubin    0.2  (0.2-1.0)  mg/dL


 


AST    17  (15-37)  IU/L


 


ALT    23  (14-63)  IU/L


 


Alkaline Phosphatase    97  ()  U/L


 


Total Protein    7.1  (6.4-8.2)  g/dL


 


Albumin    2.8 L  (3.4-5.0)  g/dL


 


Globulin    4.3 H  (2.6-4.0)  g/dL


 


Albumin/Globulin Ratio    0.7 L  (0.9-1.6)  


 


Urine Color     


 


Urine Appearance     


 


Urine pH     (5.0-8.0)  


 


Ur Specific Gravity     (1.001-1.035)  


 


Urine Protein     (NEGATIVE)  mg/dL


 


Urine Glucose (UA)     (NEGATIVE)  mg/dL


 


Urine Ketones     (NEGATIVE)  mg/dL


 


Urine Occult Blood     (NEGATIVE)  


 


Urine Nitrite     (NEGATIVE)  


 


Urine Bilirubin     (NEGATIVE)  


 


Urine Urobilinogen     (<2.0)  EU/dL


 


Ur Leukocyte Esterase     (NEGATIVE)  


 


Urine RBC     (0-2/HPF)  


 


Urine WBC     (0-5/HPF)  


 


Ur Epithelial Cells     (NONE-FEW)  


 


Amorphous Sediment     (NEGATIVE)  


 


Urine Bacteria     (NEGATIVE)  


 


Urine Mucus     (NONE-MOD)  














  20 Range/Units





  14:39 


 


WBC   (4.0-11.0)  K/uL


 


RBC   (4.30-5.90)  M/uL


 


Hgb   (12.0-16.0)  g/dL


 


Hct   (36.0-46.0)  %


 


MCV   (80.0-98.0)  fL


 


MCH   (27.0-32.0)  pg


 


MCHC   (31.0-37.0)  g/dL


 


RDW Std Deviation   (28.0-62.0)  fl


 


RDW Coeff of Virgil   (11.0-15.0)  %


 


Plt Count   (150-400)  K/uL


 


MPV   (7.40-12.00)  fL


 


Add Manual Diff   


 


Neutrophils % (Manual)   (48.0-80.0)  %


 


Lymphocytes % (Manual)   (16.0-40.0)  %


 


Monocytes % (Manual)   (0.0-15.0)  %


 


Myelocytes %   %


 


Nucleated RBC %   /100WBC


 


Absolute Seg Neuts   (1.4-5.7)  


 


Lymphocytes # (Manual)   (0.6-2.4)  


 


Monocytes # (Manual)   (0.0-0.8)  


 


Absolute Myelocytes   


 


Nucleated RBCs #   K/uL


 


INR   


 


Sodium   (136-145)  mmol/L


 


Potassium   (3.5-5.1)  mmol/L


 


Chloride   ()  mmol/L


 


Carbon Dioxide   (21.0-32.0)  mmol/L


 


BUN   (7.0-18.0)  mg/dL


 


Creatinine   (0.6-1.0)  mg/dL


 


Est Cr Clr Drug Dosing   mL/min


 


Estimated GFR (MDRD)   ml/min


 


Glucose   ()  mg/dL


 


Calcium   (8.5-10.1)  mg/dL


 


Total Bilirubin   (0.2-1.0)  mg/dL


 


AST   (15-37)  IU/L


 


ALT   (14-63)  IU/L


 


Alkaline Phosphatase   ()  U/L


 


Total Protein   (6.4-8.2)  g/dL


 


Albumin   (3.4-5.0)  g/dL


 


Globulin   (2.6-4.0)  g/dL


 


Albumin/Globulin Ratio   (0.9-1.6)  


 


Urine Color  YELLOW  


 


Urine Appearance  SLT CLOUDY  


 


Urine pH  7.0  (5.0-8.0)  


 


Ur Specific Gravity  1.010  (1.001-1.035)  


 


Urine Protein  NEGATIVE  (NEGATIVE)  mg/dL


 


Urine Glucose (UA)  NEGATIVE  (NEGATIVE)  mg/dL


 


Urine Ketones  NEGATIVE  (NEGATIVE)  mg/dL


 


Urine Occult Blood  NEGATIVE  (NEGATIVE)  


 


Urine Nitrite  NEGATIVE  (NEGATIVE)  


 


Urine Bilirubin  NEGATIVE  (NEGATIVE)  


 


Urine Urobilinogen  0.2  (<2.0)  EU/dL


 


Ur Leukocyte Esterase  SMALL H  (NEGATIVE)  


 


Urine RBC  0-2  (0-2/HPF)  


 


Urine WBC  3-5  (0-5/HPF)  


 


Ur Epithelial Cells  MODERATE  (NONE-FEW)  


 


Amorphous Sediment  MODERATE  (NEGATIVE)  


 


Urine Bacteria  2+ H  (NEGATIVE)  


 


Urine Mucus  LIGHT  (NONE-MOD)  











Meds: 


Medications














Discontinued Medications














Generic Name Dose Route Start Last Admin





  Trade Name Freq  PRN Reason Stop Dose Admin


 


Sodium Chloride  1,000 mls @ 999 mls/hr  20 14:04  20 14:14





  Normal Saline  IV  20 15:04  999 mls/hr





  STAT ONE   Administration





     





     





     





     


 


Oxymetazoline HCl  1 ml  20 14:04  20 14:11





  Afrin Original 0.05% Nasal Spray  KIMI  20 14:05  2 drop





  ONETIME ONE   Administration





     





     





     





     














Departure





- Departure


Time of Disposition: 15:33


Disposition: Home, Self-Care 01


Clinical Impression: 


 Epistaxis not due to trauma





UTI in pregnancy


Qualifiers:


 Trimester: second trimester Qualified Code(s): O23.42 - Unspecified infection 

of urinary tract in pregnancy, second trimester





Anemia


Qualifiers:


 Anemia type: unspecified type Qualified Code(s): D64.9 - Anemia, unspecified








- Discharge Information


Prescriptions: 


cephALEXin [Keflex] 500 mg PO BID 7 Days #14 cap


Instructions:  Urinary Tract Infection, Adult, Easy-to-Read, Nosebleed, Easy-to-

Read


Referrals: 


Smiley Torres PA [Primary Care Provider] - 


Forms:  ED Department Discharge


Additional Instructions: 


The following information is given to patients seen in the emergency department 

who are being discharged to home. This information is to outline your options 

for follow-up care. We provide all patients seen in our emergency department 

with a follow-up referral.





The need for follow-up, as well as the timing and circumstances, are variable 

depending upon the specifics of your emergency department visit.





If you don't have a primary care physician on staff, we will provide you with a 

referral. We always advise you to contact your personal physician following an 

emergency department visit to inform them of the circumstance of the visit and 

for follow-up with them and/or the need for any referrals to a consulting 

specialist.





The emergency department will also refer you to a specialist when appropriate. 

This referral assures that you have the opportunity for follow-up care with a 

specialist. All of these measure are taken in an effort to provide you with 

optimal care, which includes your follow-up.





Under all circumstances we always encourage you to contact your private 

physician who remains a resource for coordinating your care. When calling for 

follow-up care, please make the office aware that this follow-up is from your 

recent emergency room visit. If for any reason you are refused follow-up, 

please contact the [facility] Emergency 

Department at [phone] and asked to speak to the emergency department 

charge nurse.





[facility]


Primary Care


[address] [place] [id number]


Phone: [phone]


Fax: [phone]





[facility]


[address] [place] [address]


Phone: [phone]


Fax: [phone]





1. Avoid any strenuous activities or blowing your nose for the next 24 hours. 


2. Increase your oral fluids. Take the antibiotic as directed.


3. Tylenol as needed for pain.


4. Keep your OBGYN appointment as scheduled; would like you to show your labs 

that were done to day - may need to be repeated.


5. Return to the ED as needed and as discussed. 





Sepsis Event Note





- Evaluation


Sepsis Screening Result: No Definite Risk





- Focused Exam


Vital Signs: 


 Vital Signs











  Temp Pulse Resp BP Pulse Ox


 


 20 13:58  98.2 F  90  18  124/71  98











Date Exam was Performed: 20


Time Exam was Performed: 15:37





- My Orders


Last 24 Hours: 


My Active Orders





20 14:04


Fetal Heart Rate [RC] Click to Edit 





20 14:39


CULTURE URINE [RM] Stat 














- Assessment/Plan


Last 24 Hours: 


My Active Orders





20 14:04


Fetal Heart Rate [RC] Click to Edit 





20 14:39


CULTURE URINE [RM] Stat

## 2020-12-12 NOTE — EDM.PDOC
ED HPI GENERAL MEDICAL PROBLEM





- General


Chief Complaint: General


Stated Complaint: SORE THROAT


Time Seen by Provider: 12/12/20 11:27


Source of Information: Reports: Patient


History Limitations: Reports: No Limitations





- History of Present Illness


INITIAL COMMENTS - FREE TEXT/NARRATIVE: 


HISTORY AND PHYSICAL:





History of present illness:


Patient is a 25-year-old female who presents to the emergency room with 

complaints of generalized body aches, sore throat, fatigue and decreased 

appetite.  She states that she started having some low back pain and thought 

maybe she was having a UTI but has not had any dysuria associated with this.  

The back pain turned into generalized body aches and fatigue.  Today her main 

complaint is a sore throat and feeling like her lymph nodes are slightly 

enlarged.  She is concerned she may have COVID-19 or strep throat.





Review of systems: 


As per history of present illness and below otherwise all systems reviewed and 

negative.





Past medical history: 


As per history of present illness and as reviewed below otherwise 

noncontributory.





Surgical history: 


As per history of present illness and as reviewed below otherwise 

noncontributory.





Social history: 


See social history for further information





Family history: 


As per history of present illness and as reviewed below otherwise 

noncontributory.





Physical exam:


General: Well developed and well nourished 25-year-old female. Alert and 

orientated x 3. Nontoxic in appearance and in no acute distress. Vital signs are

stable and have been reviewed by me. Nursing notes were reviewed. 


HEENT: Atraumatic, normocephalic, pupils equal and reactive bilaterally, 

negative for conjunctival pallor or scleral icterus, mucous membranes moist, TMs

normal bilaterally, throat erythematous without exudate, neck supple, mild 

lymphadenopathy bilaterally with tenderness, trachea midline. No drooling or 

trismus noted. No meningeal signs. No hot potato voice noted. 


Lungs: Clear to auscultation, breath sounds equal bilaterally, chest nontender. 

Normal work of breathing, no accessory muscles used.


Heart: S1S2, regular rate and rhythm without overt murmur


Abdomen: Soft, nondistended, nontender. Negative for masses or 

hepatosplenomegaly. Negative for costovertebral tenderness.


Skin: Intact, warm, dry. No lesions or rashes noted.


Hematologic: No petechiae or purpra. Mucosa appropriate color and normal nail 

bed color and refill.


Extremities: Atraumatic, moves all extremities per self without difficulty or 

deficits, negative for cords or calf pain. Neurovascular unremarkable.


Neuro: Awake, alert, oriented. Cranial nerves II through XII unremarkable. 

Cerebellum unremarkable. Motor and sensory unremarkable throughout. Exam 

nonfocal.


Psychiatric: Mood and affect are appropriate.  Normal thought process. Answering

questions appropriately.





Notes:


All diagnostics are unremarkable.  Due to her physical exam I did offer to give 

her antibiotics versus watching and waiting.  She would like to proceed with the

antibiotic.  Augmentin is breast-feeding safe.  I have talked with the patient 

about today's findings, in addition to providing specific details for plan of 

care.  Reassessment at the time of disposition demonstrates that the patient is 

in no acute distress.  The patient is stable for discharge, counseling was 

provided and we discussed in great detail signs and symptoms that would prompt 

them to return to the Emergency Department. Medication, follow up and supportive

care measures were reviewed and discussed. Voices understanding and is agreeable

to plan of care. Denies any further questions or concerns at this time.





Diagnostics:


Influenza/COVID, UA, HCGU, strep





Therapeutics:


None





Prescription:


Augmentin





Impression: 


Pharyngitis





Plan:


1. Today your Influenza/COVID and UA were within normal limits. Good handwashing

and contact precautions as we discussed.


2. Warm Salt water gargles (rinse and spit) 3-4 x daily. Please get a new tooth 

brush after completion of your medication


3. Tylenol and or ibuprofen as needed for pain management.


4. Follow-up with your primary care provider in the next 1-2 days. If your 

symptoms should worsen, new symptoms develop or any of the signs and symptoms we

discussed should arise please return to the emergency room or call 911 (if 

needed).





Definitive disposition and diagnosis as appropriate pending reevaluation and 

review of above.








- Related Data


                                    Allergies











Allergy/AdvReac Type Severity Reaction Status Date / Time


 


aspirin Allergy  Respiratory Verified 12/12/20 12:07





   Distress  


 


latex Allergy  Rash Verified 12/12/20 12:07


 


naproxen [From Aleve] Allergy  Respiratory Verified 12/12/20 12:07





   Distress  


 


mashroom Allergy  Respiratory Uncoded 12/12/20 12:07





   Distress  











Home Meds: 


                                    Home Meds





Prenatal Comb No.42/Folic Acid [Prena1 Chew Tablet] 1.4 mg PO ASDIRECTED 

11/17/19 [History]


Amoxicillin/Clavulanate K [Augmentin 875-125 MG] 1 tab PO BID 10 Days #20 tablet

12/12/20 [Rx]


PARoxetine HCL [Paroxetine HCl] 30 mg PO DAILY 12/12/20 [History]











Past Medical History





- Past Health History


Medical/Surgical History: Denies Medical/Surgical History


HEENT History: Reports: None


Cardiovascular History: Reports: High Cholesterol


Respiratory History: Reports: Asthma


Gastrointestinal History: Reports: None


Genitourinary History: Reports: None


OB/GYN History: Reports: Other (See Below)


Other OB/GYN History: irreg. periods


Musculoskeletal History: Reports: Other (See Below)


Other Musculoskeletal History: scoliosis


Neurological History: Reports: None


Psychiatric History: Reports: Anxiety, Depression


Endocrine/Metabolic History: Reports: None


Hematologic History: Reports: Other (See Below)


Other Hematologic History: Sickle cell trait, Factor 7, Factor 11 and factor 12 

deficiency


Immunologic History: Reports: None


Oncologic (Cancer) History: Reports: None


Dermatologic History: Reports: None





- Infectious Disease History


Infectious Disease History: Reports: Chicken Pox





- Past Surgical History


Head Surgeries/Procedures: Reports: None


HEENT Surgical History: Reports: Adenoidectomy, Tonsillectomy





Social & Family History





- Family History


Family Medical History: No Pertinent Family History





- Caffeine Use


Caffeine Use: Reports: None





ED ROS GENERAL





- Review of Systems


Review Of Systems: Comprehensive ROS is negative, except as noted in HPI.





ED EXAM, GENERAL





- Physical Exam


Exam: See Below (See dictation)





Course





- Vital Signs


Last Recorded V/S: 


                                Last Vital Signs











Temp  98 F   12/12/20 12:04


 


Pulse  69   12/12/20 13:33


 


Resp  14   12/12/20 13:33


 


BP  128/79   12/12/20 13:33


 


Pulse Ox  98   12/12/20 13:33














- Orders/Labs/Meds


Orders: 


                               Active Orders 24 hr











 Category Date Time Status


 


 CULTURE STREP A CONFIRMATION [RM] Stat Lab  12/12/20 12:13 Results


 


 STREP SCRN A RAPID W CULT CONF [RM] Stat Lab  12/12/20 12:13 Results











Labs: 


                                Laboratory Tests











  12/12/20 12/12/20 12/12/20 Range/Units





  12:13 13:13 13:13 


 


Urine Color   YELLOW   


 


Urine Appearance   CLEAR   


 


Urine pH   6.5   (5.0-8.0)  


 


Ur Specific Gravity   1.020   (1.001-1.035)  


 


Urine Protein   NEGATIVE   (NEGATIVE)  mg/dL


 


Urine Glucose (UA)   NEGATIVE   (NEGATIVE)  mg/dL


 


Urine Ketones   NEGATIVE   (NEGATIVE)  mg/dL


 


Urine Occult Blood   MODERATE H   (NEGATIVE)  


 


Urine Nitrite   NEGATIVE   (NEGATIVE)  


 


Urine Bilirubin   NEGATIVE   (NEGATIVE)  


 


Urine Urobilinogen   0.2   (<2.0)  EU/dL


 


Ur Leukocyte Esterase   NEGATIVE   (NEGATIVE)  


 


Urine RBC   1-2   (0-2/HPF)  


 


Urine WBC   0-1   (0-5/HPF)  


 


Ur Epithelial Cells   RARE   (NONE-FEW)  


 


Urine Bacteria   RARE   (NEGATIVE)  


 


Urine HCG, Qual    NEGATIVE  (NEGATIVE)  


 


Influenza Type A RNA  NEGATIVE    (NEGATIVE)  


 


Influenza Type B RNA  NEGATIVE    (NEGATIVE)  


 


SARS-CoV-2 RNA (ALVARO)  NEGATIVE    (NEGATIVE)  














Departure





- Departure


Time of Disposition: 13:42


Disposition: Home, Self-Care 01


Clinical Impression: 


Pharyngitis


Qualifiers:


 Pharyngitis/tonsillitis etiology: unspecified etiology Qualified Code(s): J02.9

- Acute pharyngitis, unspecified








- Discharge Information


Prescriptions: 


Amoxicillin/Clavulanate K [Augmentin 875-125 MG] 1 tab PO BID 10 Days #20 tablet


Instructions:  Pharyngitis, Easy-to-Read


Referrals: 


Jacinto Bennett MD [Primary Care Provider] - 


Forms:  ED Department Discharge


Additional Instructions: 


The following information is given to patients seen in the emergency department 

who are being discharged to home. This information is to outline your options 

for follow-up care. We provide all patients seen in our emergency department 

with a follow-up referral.





The need for follow-up, as well as the timing and circumstances, are variable 

depending upon the specifics of your emergency department visit.





If you don't have a primary care physician on staff, we will provide you with a 

referral. We always advise you to contact your personal physician following an 

emergency department visit to inform them of the circumstance of the visit and 

for follow-up with them and/or the need for any referrals to a consulting 

specialist.





The emergency department will also refer you to a specialist when appropriate. 

This referral assures that you have the opportunity for follow-up care with a 

specialist. All of these measure are taken in an effort to provide you with 

optimal care, which includes your follow-up.





Under all circumstances we always encourage you to contact your private 

physician who remains a resource for coordinating your care. When calling for 

follow-up care, please make the office aware that this follow-up is from your 

recent emergency room visit. If for any reason you are refused follow-up, please

contact the [facility] Emergency Department

at [phone] and asked to speak to the emergency department charge nurse.





[facility]


Primary Care


[address] [place] [address]


Phone: [phone]


Fax: [phone]





[facility]


[address] [place] [address]


Phone: [phone]


Fax: [phone]





Thank you for choosing the [facility] emergency department in 

[place] for your medical needs today.  It was a pleasure caring for you. Today

you were seen in the emergency department for sore throat and body aches.





1. Today your Influenza/COVID and UA were within normal limits. Good handwashing

and contact precautions as we discussed. Take the medication as directed.  The 

antibiotic is safe in breast-feeding.


2. Warm Salt water gargles (rinse and spit) 3-4 x daily. Please get a new tooth 

brush after completion of your medication


3. Tylenol and or ibuprofen as needed for pain management.


4. Follow-up with your primary care provider in the next 1-2 days. If your 

symptoms should worsen, new symptoms develop or any of the signs and symptoms we

discussed should arise please return to the emergency room or call 911 (if 

needed).





Sepsis Event Note (ED)





- Focused Exam


Vital Signs: 


                                   Vital Signs











  Temp Pulse Resp BP Pulse Ox


 


 12/12/20 13:33   69  14  128/79  98


 


 12/12/20 12:04  98 F  90  16  116/70  94 L














- My Orders


Last 24 Hours: 


My Active Orders





12/12/20 12:13


CULTURE STREP A CONFIRMATION [RM] Stat 


STREP SCRN A RAPID W CULT CONF [RM] Stat 














- Assessment/Plan


Last 24 Hours: 


My Active Orders





12/12/20 12:13


CULTURE STREP A CONFIRMATION [RM] Stat 


STREP SCRN A RAPID W CULT CONF [] Stat

## 2021-09-10 NOTE — EDM.PDOC
ED HPI GENERAL MEDICAL PROBLEM





- General


Chief Complaint: Respiratory Problem


Stated Complaint: EXPOSED TO COVID,CHILLS, FATIGUE, STOMACH CRAMPS


Time Seen by Provider: 09/10/21 21:03


Source of Information: Reports: Patient


History Limitations: Reports: No Limitations





- History of Present Illness


INITIAL COMMENTS - FREE TEXT/NARRATIVE: 





Patient is a 25-year-old female presents today for multiple complaints.  Her 

most pressing complaint is that she is here blood pressure  and went

to a school in [place] and may have been exposed to outbreak there.  

Patient on exam denies any shortness of breath vitals are stable she is 100 on 

room air she looks well.  She does have some recurrent diarrhea abdominal pain 

from possible Crohn's diagnosis but does not feel different baseline.  She did 

report some increased urination but no blood or foul-smelling urine she still 

been tolerating p.o. was normal


  ** abdomen


Pain Score (Numeric/FACES): 7





- Related Data


                                    Allergies











Allergy/AdvReac Type Severity Reaction Status Date / Time


 


aspirin Allergy  Respiratory Verified 09/10/21 21:05





   Distress  


 


latex Allergy  Rash Verified 09/10/21 21:05


 


naproxen [From Aleve] Allergy  Respiratory Verified 09/10/21 21:05





   Distress  


 


mashroom Allergy  Respiratory Uncoded 09/10/21 21:05





   Distress  











Home Meds: 


                                    Home Meds





Acetaminophen/Codeine [Tylenol with Codeine No.3 300MG/30MG] 1 tab PO Q6H PRN 

09/10/21 [History]


Albuterol Sulfate [Albuterol Sulfate HFA] 8.5 gm INH Q4H PRN 09/10/21 [History]


Budesonide [Ortikos] 9 mg PO DAILY 09/10/21 [History]


FLUoxetine [PROzac] 10 mg PO DAILY 09/10/21 [History]


Fluticasone Furoate [Arnuity Ellipta] 50 mcg INH DAILY 09/10/21 [History]











Past Medical History





- Past Health History


Medical/Surgical History: Denies Medical/Surgical History


HEENT History: Reports: None


Cardiovascular History: Reports: High Cholesterol


Respiratory History: Reports: Asthma


Gastrointestinal History: Reports: None


Genitourinary History: Reports: None


OB/GYN History: Reports: Pregnancy, Other (See Below)


Other OB/GYN History: irregular periods


Musculoskeletal History: Reports: Other (See Below)


Other Musculoskeletal History: scoliosis


Neurological History: Reports: None


Psychiatric History: Reports: Anxiety, Depression


Endocrine/Metabolic History: Reports: None


Hematologic History: Reports: Other (See Below)


Other Hematologic History: Sickle cell trait, Factor 7, Factor 11 and factor 12 

deficiency


Immunologic History: Reports: None


Oncologic (Cancer) History: Reports: None


Dermatologic History: Reports: None





- Infectious Disease History


Infectious Disease History: Reports: Chicken Pox, Mononucleosis





- Past Surgical History


Head Surgeries/Procedures: Reports: None


HEENT Surgical History: Reports: Adenoidectomy, Tonsillectomy


Cardiovascular Surgical History: Reports: None


Respiratory Surgical History: Reports: None


Female  Surgical History: Reports:  Section


Musculoskeletal Surgical History: Reports: None


Other Musculoskeletal Surgeries/Procedures:: scoliosis





Social & Family History





- Family History


Family Medical History: No Pertinent Family History





- Tobacco Use


Tobacco Use Status *Q: Never Tobacco User


Second Hand Smoke Exposure: No





- Caffeine Use


Caffeine Use: Reports: Energy Drinks





- Recreational Drug Use


Recreational Drug Use: No





ED ROS GENERAL





- Review of Systems


Review Of Systems: See Below


Constitutional: Reports: No Symptoms


HEENT: Reports: No Symptoms


Respiratory: Reports: No Symptoms


Cardiovascular: Reports: No Symptoms


Endocrine: Reports: No Symptoms


GI/Abdominal: Reports: Abdominal Pain, Diarrhea


: Reports: No Symptoms


Musculoskeletal: Reports: No Symptoms


Skin: Reports: No Symptoms


Neurological: Reports: No Symptoms


Psychiatric: Reports: No Symptoms


Hematologic/Lymphatic: Reports: No Symptoms


Immunologic: Reports: No Symptoms





ED EXAM, GENERAL





- Physical Exam


Exam: See Below


Exam Limited By: No Limitations


General Appearance: Alert, WD/WN, No Apparent Distress


Respiratory/Chest: No Respiratory Distress, Lungs Clear, Normal Breath Sounds


Cardiovascular: Normal Peripheral Pulses, Regular Rate, Rhythm


GI/Abdominal: Normal Bowel Sounds, Soft, Non-Tender


Extremities: Normal Inspection, Normal Range of Motion


Neurological: Alert, Oriented, Normal Cognition, Normal Gait





Course





- Vital Signs


Last Recorded V/S: 


                                Last Vital Signs











Temp  96.8 F L  09/10/21 21:01


 


Pulse  87   09/10/21 21:01


 


Resp  16   09/10/21 21:01


 


BP  137/90   09/10/21 21:01


 


Pulse Ox  100   09/10/21 21:01














- Orders/Labs/Meds


Labs: 


                                Laboratory Tests











  09/10/21 09/10/21 09/10/21 Range/Units





  21:15 21:30 21:35 


 


WBC    11.67 H  (4.0-11.0)  K/uL


 


RBC    4.59  (4.30-5.90)  M/uL


 


Hgb    11.9 L  (12.0-16.0)  g/dL


 


Hct    35.0 L  (36.0-46.0)  %


 


MCV    76.3 L  (80.0-98.0)  fL


 


MCH    25.9 L  (27.0-32.0)  pg


 


MCHC    34.0  (31.0-37.0)  g/dL


 


RDW Std Deviation    37.8  (28.0-62.0)  fl


 


RDW Coeff of Virgil    14  (11.0-15.0)  %


 


Plt Count    331  (150-400)  K/uL


 


MPV    9.60  (7.40-12.00)  fL


 


Neut % (Auto)    61.5  (48.0-80.0)  %


 


Lymph % (Auto)    28.6  (16.0-40.0)  %


 


Mono % (Auto)    8.1  (0.0-15.0)  %


 


Eos % (Auto)    1.5  (0.0-7.0)  %


 


Baso % (Auto)    0.3  (0.0-1.5)  %


 


Neut # (Auto)    7.2 H  (1.4-5.7)  K/uL


 


Lymph # (Auto)    3.3 H  (0.6-2.4)  K/uL


 


Mono # (Auto)    1.0 H  (0.0-0.8)  K/uL


 


Eos # (Auto)    0.2  (0.0-0.7)  K/uL


 


Baso # (Auto)    0.0  (0.0-0.1)  K/uL


 


Nucleated RBC %    0.0  /100WBC


 


Nucleated RBCs #    0  K/uL


 


Sodium     (136-145)  mmol/L


 


Potassium     (3.5-5.1)  mmol/L


 


Chloride     ()  mmol/L


 


Carbon Dioxide     (21.0-32.0)  mmol/L


 


BUN     (7.0-18.0)  mg/dL


 


Creatinine     (0.6-1.0)  mg/dL


 


Est Cr Clr Drug Dosing     mL/min


 


Estimated GFR (MDRD)     ml/min


 


Glucose     ()  mg/dL


 


Calcium     (8.5-10.1)  mg/dL


 


Total Bilirubin     (0.2-1.0)  mg/dL


 


AST     (15-37)  IU/L


 


ALT     (14-63)  IU/L


 


Alkaline Phosphatase     ()  U/L


 


Total Protein     (6.4-8.2)  g/dL


 


Albumin     (3.4-5.0)  g/dL


 


Globulin     (2.6-4.0)  g/dL


 


Albumin/Globulin Ratio     (0.9-1.6)  


 


Urine Color  YELLOW    


 


Urine Appearance  CLEAR    


 


Urine pH  7.0    (5.0-8.0)  


 


Ur Specific Gravity  1.020    (1.001-1.035)  


 


Urine Protein  NEGATIVE    (NEGATIVE)  mg/dL


 


Urine Glucose (UA)  NEGATIVE    (NEGATIVE)  mg/dL


 


Urine Ketones  NEGATIVE    (NEGATIVE)  mg/dL


 


Urine Occult Blood  NEGATIVE    (NEGATIVE)  


 


Urine Nitrite  NEGATIVE    (NEGATIVE)  


 


Urine Bilirubin  NEGATIVE    (NEGATIVE)  


 


Urine Urobilinogen  0.2    (<2.0)  EU/dL


 


Ur Leukocyte Esterase  NEGATIVE    (NEGATIVE)  


 


SARS-CoV-2 RNA (ALVARO)   NEGATIVE   (NEGATIVE)  














  09/10/21 Range/Units





  21:35 


 


WBC   (4.0-11.0)  K/uL


 


RBC   (4.30-5.90)  M/uL


 


Hgb   (12.0-16.0)  g/dL


 


Hct   (36.0-46.0)  %


 


MCV   (80.0-98.0)  fL


 


MCH   (27.0-32.0)  pg


 


MCHC   (31.0-37.0)  g/dL


 


RDW Std Deviation   (28.0-62.0)  fl


 


RDW Coeff of Virgil   (11.0-15.0)  %


 


Plt Count   (150-400)  K/uL


 


MPV   (7.40-12.00)  fL


 


Neut % (Auto)   (48.0-80.0)  %


 


Lymph % (Auto)   (16.0-40.0)  %


 


Mono % (Auto)   (0.0-15.0)  %


 


Eos % (Auto)   (0.0-7.0)  %


 


Baso % (Auto)   (0.0-1.5)  %


 


Neut # (Auto)   (1.4-5.7)  K/uL


 


Lymph # (Auto)   (0.6-2.4)  K/uL


 


Mono # (Auto)   (0.0-0.8)  K/uL


 


Eos # (Auto)   (0.0-0.7)  K/uL


 


Baso # (Auto)   (0.0-0.1)  K/uL


 


Nucleated RBC %   /100WBC


 


Nucleated RBCs #   K/uL


 


Sodium  140  (136-145)  mmol/L


 


Potassium  3.5  (3.5-5.1)  mmol/L


 


Chloride  103  ()  mmol/L


 


Carbon Dioxide  29.0  (21.0-32.0)  mmol/L


 


BUN  11  (7.0-18.0)  mg/dL


 


Creatinine  0.9  (0.6-1.0)  mg/dL


 


Est Cr Clr Drug Dosing  92.92  mL/min


 


Estimated GFR (MDRD)  > 60.0  ml/min


 


Glucose  99  ()  mg/dL


 


Calcium  8.6  (8.5-10.1)  mg/dL


 


Total Bilirubin  0.5  (0.2-1.0)  mg/dL


 


AST  16  (15-37)  IU/L


 


ALT  27  (14-63)  IU/L


 


Alkaline Phosphatase  77  ()  U/L


 


Total Protein  7.7  (6.4-8.2)  g/dL


 


Albumin  4.0  (3.4-5.0)  g/dL


 


Globulin  3.7  (2.6-4.0)  g/dL


 


Albumin/Globulin Ratio  1.1  (0.9-1.6)  


 


Urine Color   


 


Urine Appearance   


 


Urine pH   (5.0-8.0)  


 


Ur Specific Gravity   (1.001-1.035)  


 


Urine Protein   (NEGATIVE)  mg/dL


 


Urine Glucose (UA)   (NEGATIVE)  mg/dL


 


Urine Ketones   (NEGATIVE)  mg/dL


 


Urine Occult Blood   (NEGATIVE)  


 


Urine Nitrite   (NEGATIVE)  


 


Urine Bilirubin   (NEGATIVE)  


 


Urine Urobilinogen   (<2.0)  EU/dL


 


Ur Leukocyte Esterase   (NEGATIVE)  


 


SARS-CoV-2 RNA (ALVARO)   (NEGATIVE)  














- Re-Assessments/Exams


Free Text/Narrative Re-Assessment/Exam: 





09/10/21 22:26


Patient was reviewed no UTI Covid negative patient discharged home.





Departure





- Departure


Time of Disposition: 22:26


Disposition: Home, Self-Care 01


Condition: Good


Clinical Impression: 


 Viral illness








- Discharge Information


*PRESCRIPTION DRUG MONITORING PROGRAM REVIEWED*: Not Applicable


*COPY OF PRESCRIPTION DRUG MONITORING REPORT IN PATIENT CLARISSA: Not Applicable


Instructions:  Viral Illness, Adult


Referrals: 


Jacinto Bennett MD [Primary Care Provider] - 


Forms:  ED Department Discharge


Additional Instructions: 


The following information is given to patients seen in the emergency department 

who are being discharged to home. This information is to outline your options 

for follow-up care. We provide all patients seen in our emergency department 

with a follow-up referral.





The need for follow-up, as well as the timing and circumstances, are variable 

depending upon the specifics of your emergency department visit.





If you don't have a primary care physician on staff, we will provide you with a 

referral. We always advise you to contact your personal physician following an 

emergency department visit to inform them of the circumstance of the visit and 

for follow-up with them and/or the need for any referrals to a consulting 

specialist.





The emergency department will also refer you to a specialist when appropriate. 

This referral assures that you have the opportunity for follow-up care with a 

specialist. All of these measure are taken in an effort to provide you with 

optimal care, which includes your follow-up.





Under all circumstances we always encourage you to contact your private 

physician who remains a resource for coordinating your care. When calling for 

follow-up care, please make the office aware that this follow-up is from your 

recent emergency room visit. If for any reason you are refused follow-up, please

contact the [facility] Emergency Department

at [phone] and asked to speak to the emergency department charge nurse.





Please follow up with your primary care physician. If you do not have a primary 

care physician, see below:


[facility] Primary Care


[address] [place] [id number] [phone]





[facility]


[address] [place] [id number] [phone]





Seen here for various issues of diarrhea fatigue and not feeling well.  We did 

labs urinalysis and Covid test was all normal.  We recommend you follow-up with 

primary care physician continue to have symptoms or have any other concerning 

signs or symptoms please feel free to return to the ED.





Sepsis Event Note (ED)





- Evaluation


Sepsis Screening Result: No Definite Risk





- Focused Exam


Vital Signs: 


                                   Vital Signs











  Temp Pulse Resp BP Pulse Ox


 


 09/10/21 21:01  96.8 F L  87  16  137/90  100














- Assessment/Plan


Plan: 





Is a 25-year-old female who presents today for multiple complaints most 

concerning 1 to be tested for Covid.  We will obtain basic labs UA Covid swab 

and reassess.

## 2021-11-06 NOTE — EDM.PDOC
ED HPI GENERAL MEDICAL PROBLEM





- General


Chief Complaint: ENT Problem


Stated Complaint: RUNNY NOSE/ HEADACHE/ NO FEVER


Time Seen by Provider: 21 11:29


Source of Information: Reports: Patient


History Limitations: Reports: No Limitations





- History of Present Illness


INITIAL COMMENTS - FREE TEXT/NARRATIVE: 


HISTORY AND PHYSICAL:





History of present illness:


Patient is a 26-year-old female who presents emergency room today with concern 

of stuffy/runny nose, bilateral ear pain, and sore throat over the past 2 days. 

Patient states that she has not taken anything over-the-counter for her 

symptoms.





Patient denies fever, chills, chest pain, shortness of breath, or cough. Denies 

headache, neck stiff ness, change in vision, syncope, or near syncope. Denies 

nausea, vomiting, abdominal pain, diarrhea, constipation, or dysuria. Has not 

noted any blood in urine or stool. Patient has been eating and drinking 

appropriately.





Review of systems: 


As per history of present illness and below otherwise all systems reviewed and 

negative.





Past medical history: 


As per history of present illness and as reviewed below otherwise 

noncontributory.





Surgical history: 


As per history of present illness and as reviewed below otherwise 

noncontributory.





Social history: 


See social history for further information





Family history: 


As per history of present illness and as reviewed below otherwise 

noncontributory.





Physical exam:


General: Patient is alert, oriented, and in no acute distress. Patient sitting 

comfortably on exam table.  Vitals stable and reviewed by me.


HEENT:Atraumatic, normocephalic, pupils equal and reactive bilaterally, negative

for conjunctival pallor or scleral icterus, mucous membranes moist, throat is 

erythematous without exudate, tonsils absent, uvula midline,, neck supple, 

nontender, trachea midline. No drooling or trismus noted. No meningeal signs. No

hot potato voice noted. 


Lungs: Clear to auscultation, breath sounds equal bilaterally, chest nontender.


Heart: S1S2, regular rate and rhythm without overt murmur


Abdomen: Soft, nondistended, nontender. Negative for masses or 

hepatosplenomegaly. Negative for costovertebral tenderness.


Pelvis: Stable nontender.


Genitourinary: Deferred.


Rectal: Deferred.


Skin: Intact, warm, dry. No lesions or rashes noted.


Extremities: Atraumatic, negative for cords or calf pain. Neurovascular 

unremarkable.


Neuro: Awake, alert, oriented. Cranial nerves II through XII unremarkable. 

Cerebellum unremarkable. Motor and sensory unremarkable throughout. Exam 

nonfocal.





Medical Decision Making:


Signs and symptoms are prompt return to the ED thoroughly discussed with 

patient.  Discussed importance for follow-up with a primary care provider.


Voices understanding and is agreeable to plan of care. Denies any further 

questions or concerns at this time.





Diagnostics:


Covid/influenza, strep





Therapeutics:


None





Prescription:


None





Impression: 


Viral syndrome


Pharyngitis





Plan:


1. Use cough drops and/or other over the counter medications as needed for 

throat discomfort as discussed. Drink small but frequent sips of fluid to 

prevent dehydration.


2. Alternate Ibuprofen and Tylenol as directed for pain and discomfort.


3. Follow up with your primary care provider as discussed.


4. Return to the ED as needed and as discussed.








Definitive disposition and diagnosis as appropriate pending reevaluation and 

review of above.





  ** ENT


Pain Score (Numeric/FACES): 10





- Related Data


                                    Allergies











Allergy/AdvReac Type Severity Reaction Status Date / Time


 


aspirin Allergy  Respiratory Verified 21 11:58





   Distress  


 


latex Allergy  Rash Verified 21 11:58


 


naproxen [From Aleve] Allergy  Respiratory Verified 21 11:58





   Distress  


 


mashroom Allergy  Respiratory Uncoded 21 11:58





   Distress  











Home Meds: 


                                    Home Meds





Acetaminophen/Codeine [Tylenol with Codeine No.3 300MG/30MG] 1 tab PO Q6H PRN 0

9/10/21 [History]


Albuterol Sulfate [Albuterol Sulfate HFA] 8.5 gm INH Q4H PRN 09/10/21 [History]


Budesonide [Ortikos] 9 mg PO DAILY 09/10/21 [History]


FLUoxetine [PROzac] 10 mg PO DAILY 09/10/21 [History]


Fluticasone Furoate [Arnuity Ellipta] 50 mcg INH DAILY 09/10/21 [History]











Past Medical History





- Past Health History


Medical/Surgical History: Denies Medical/Surgical History


HEENT History: Reports: None


Cardiovascular History: Reports: High Cholesterol


Respiratory History: Reports: Asthma


Gastrointestinal History: Reports: None


Genitourinary History: Reports: None


OB/GYN History: Reports: Pregnancy, Other (See Below)


Other OB/GYN History: irregular periods


Musculoskeletal History: Reports: Other (See Below)


Other Musculoskeletal History: scoliosis


Neurological History: Reports: None


Psychiatric History: Reports: Anxiety, Depression


Endocrine/Metabolic History: Reports: None


Hematologic History: Reports: Other (See Below)


Other Hematologic History: Sickle cell trait, Factor 7, Factor 11 and factor 12 

deficiency, thalissemia


Immunologic History: Reports: None


Oncologic (Cancer) History: Reports: None


Dermatologic History: Reports: None





- Infectious Disease History


Infectious Disease History: Reports: Chicken Pox, Mononucleosis





- Past Surgical History


Head Surgeries/Procedures: Reports: None


HEENT Surgical History: Reports: Adenoidectomy, Tonsillectomy


Cardiovascular Surgical History: Reports: None


Respiratory Surgical History: Reports: None


Female  Surgical History: Reports:  Section


Musculoskeletal Surgical History: Reports: None


Other Musculoskeletal Surgeries/Procedures:: scoliosis





Social & Family History





- Family History


Family Medical History: No Pertinent Family History





- Tobacco Use


Tobacco Use Status *Q: Never Tobacco User





- Caffeine Use


Caffeine Use: Reports: Coffee, Energy Drinks, Soda, Tea





- Recreational Drug Use


Recreational Drug Use: No





ED ROS GENERAL





- Review of Systems


Review Of Systems: Comprehensive ROS is negative, except as noted in HPI.





ED EXAM, GENERAL





- Physical Exam


Exam: See Below (see dictation)





Course





- Vital Signs


Last Recorded V/S: 


                                Last Vital Signs











Temp  97.0 F   21 11:58


 


Pulse  85   21 13:38


 


Resp  20   21 13:38


 


BP  113/68   21 13:38


 


Pulse Ox  98   21 13:38














- Orders/Labs/Meds


Labs: 


                                Laboratory Tests











  21 Range/Units





  12:30 12:30 


 


Influenza Type A RNA  NEGATIVE   (NEGATIVE)  


 


Influenza Type B RNA  NEGATIVE   (NEGATIVE)  


 


SARS-CoV-2 RNA (ALVARO)  NEGATIVE   (NEGATIVE)  


 


Group A Strep (PCR)   NOT DETECTED  (NOT DETECT)  














Departure





- Departure


Time of Disposition: 13:25


Disposition: Home, Self-Care 01


Clinical Impression: 


 Viral syndrome





Pharyngitis


Qualifiers:


 Pharyngitis/tonsillitis etiology: unspecified etiology Qualified Code(s): J02.9

 - Acute pharyngitis, unspecified








- Discharge Information


Instructions:  Viral Illness, Adult, Pharyngitis, Easy-to-Read


Referrals: 


PCP,None [Primary Care Provider] - 


Forms:  ED Department Discharge


Additional Instructions: 


The following information is given to patients seen in the emergency department 

who are being discharged to home. This information is to outline your options 

for follow-up care. We provide all patients seen in our emergency department 

with a follow-up referral.





The need for follow-up, as well as the timing and circumstances, are variable 

depending upon the specifics of your emergency department visit.





If you don't have a primary care physician on staff, we will provide you with a 

referral. We always advise you to contact your personal physician following an 

emergency department visit to inform them of the circumstance of the visit and 

for follow-up with them and/or the need for any referrals to a consulting 

specialist.





The emergency department will also refer you to a specialist when appropriate. 

This referral assures that you have the opportunity for follow-up care with a 

specialist. All of these measure are taken in an effort to provide you with 

optimal care, which includes your follow-up.





Under all circumstances we always encourage you to contact your private 

physician who remains a resource for coordinating your care. When calling for 

follow-up care, please make the office aware that this follow-up is from your 

recent emergency room visit. If for any reason you are refused follow-up, please

contact the [facility] Emergency Department

at [phone] and asked to speak to the emergency department charge nurse.





[facility]


Primary Care


[address] [place] [address]


Phone: [phone]


Fax: [phone]





[address] [address]


Phone: [phone]


Fax: [phone]





1. Use cough drops and/or other over the counter medications as needed for 

throat discomfort as discussed. Drink small but frequent sips of fluid to 

prevent dehydration.


2. Alternate Ibuprofen and Tylenol as directed for pain and discomfort.


3. Follow up with your primary care provider as discussed.


4. Return to the ED as needed and as discussed.








 











Sepsis Event Note (ED)





- Evaluation


Sepsis Screening Result: No Definite Risk





- Focused Exam


Vital Signs: 


                                   Vital Signs











  Temp Pulse Resp BP Pulse Ox


 


 21 13:38   85  20  113/68  98


 


 21 11:58  97.0 F  87  18  126/83  100